# Patient Record
Sex: FEMALE | Race: WHITE | Employment: FULL TIME | URBAN - NONMETROPOLITAN AREA
[De-identification: names, ages, dates, MRNs, and addresses within clinical notes are randomized per-mention and may not be internally consistent; named-entity substitution may affect disease eponyms.]

---

## 2024-06-21 ENCOUNTER — NURSE ONLY (OUTPATIENT)
Dept: LAB | Age: 59
End: 2024-06-21

## 2024-06-21 DIAGNOSIS — Z51.81 MEDICATION MONITORING ENCOUNTER: ICD-10-CM

## 2024-06-21 DIAGNOSIS — M46.90 AXIAL SPONDYLOARTHRITIS (HCC): ICD-10-CM

## 2024-06-21 LAB
ALBUMIN SERPL BCG-MCNC: 4.3 G/DL (ref 3.5–5.1)
ALP SERPL-CCNC: 77 U/L (ref 38–126)
ALT SERPL W/O P-5'-P-CCNC: 16 U/L (ref 11–66)
ANION GAP SERPL CALC-SCNC: 11 MEQ/L (ref 8–16)
AST SERPL-CCNC: 17 U/L (ref 5–40)
BASOPHILS ABSOLUTE: 0 THOU/MM3 (ref 0–0.1)
BASOPHILS NFR BLD AUTO: 0.4 %
BILIRUB SERPL-MCNC: 0.4 MG/DL (ref 0.3–1.2)
BUN SERPL-MCNC: 21 MG/DL (ref 7–22)
CALCIUM SERPL-MCNC: 9.6 MG/DL (ref 8.5–10.5)
CHLORIDE SERPL-SCNC: 103 MEQ/L (ref 98–111)
CO2 SERPL-SCNC: 28 MEQ/L (ref 23–33)
CREAT SERPL-MCNC: 0.5 MG/DL (ref 0.4–1.2)
CRP SERPL-MCNC: < 0.3 MG/DL (ref 0–1)
DEPRECATED RDW RBC AUTO: 44.2 FL (ref 35–45)
EOSINOPHIL NFR BLD AUTO: 0.2 %
EOSINOPHILS ABSOLUTE: 0 THOU/MM3 (ref 0–0.4)
ERYTHROCYTE [DISTWIDTH] IN BLOOD BY AUTOMATED COUNT: 13 % (ref 11.5–14.5)
ERYTHROCYTE [SEDIMENTATION RATE] IN BLOOD BY WESTERGREN METHOD: 14 MM/HR (ref 0–20)
GFR SERPL CREATININE-BSD FRML MDRD: > 90 ML/MIN/1.73M2
GLUCOSE SERPL-MCNC: 105 MG/DL (ref 70–108)
HCT VFR BLD AUTO: 40.7 % (ref 37–47)
HGB BLD-MCNC: 13.7 GM/DL (ref 12–16)
IMM GRANULOCYTES # BLD AUTO: 0.05 THOU/MM3 (ref 0–0.07)
IMM GRANULOCYTES NFR BLD AUTO: 0.6 %
LYMPHOCYTES ABSOLUTE: 1.3 THOU/MM3 (ref 1–4.8)
LYMPHOCYTES NFR BLD AUTO: 16 %
MCH RBC QN AUTO: 31.6 PG (ref 26–33)
MCHC RBC AUTO-ENTMCNC: 33.7 GM/DL (ref 32.2–35.5)
MCV RBC AUTO: 93.8 FL (ref 81–99)
MONOCYTES ABSOLUTE: 0.5 THOU/MM3 (ref 0.4–1.3)
MONOCYTES NFR BLD AUTO: 5.7 %
NEUTROPHILS ABSOLUTE: 6.2 THOU/MM3 (ref 1.8–7.7)
NEUTROPHILS NFR BLD AUTO: 77.1 %
NRBC BLD AUTO-RTO: 0 /100 WBC
PLATELET # BLD AUTO: 229 THOU/MM3 (ref 130–400)
PMV BLD AUTO: 10.2 FL (ref 9.4–12.4)
POTASSIUM SERPL-SCNC: 3.5 MEQ/L (ref 3.5–5.2)
PROT SERPL-MCNC: 7.1 G/DL (ref 6.1–8)
RBC # BLD AUTO: 4.34 MILL/MM3 (ref 4.2–5.4)
SODIUM SERPL-SCNC: 142 MEQ/L (ref 135–145)
WBC # BLD AUTO: 8.1 THOU/MM3 (ref 4.8–10.8)

## 2024-06-21 RX ORDER — METHOTREXATE 25 MG/ML
20 INJECTION, SOLUTION INTRA-ARTERIAL; INTRAMUSCULAR; INTRAVENOUS WEEKLY
Qty: 4 ML | Refills: 0 | Status: SHIPPED | OUTPATIENT
Start: 2024-06-21

## 2024-07-17 ENCOUNTER — HOSPITAL ENCOUNTER (OUTPATIENT)
Dept: WOMENS IMAGING | Age: 59
Discharge: HOME | End: 2024-07-17
Payer: COMMERCIAL

## 2024-07-17 DIAGNOSIS — N63.21 MASS OF UPPER OUTER QUADRANT OF LEFT BREAST: ICD-10-CM

## 2024-07-17 PROCEDURE — G0279 TOMOSYNTHESIS, MAMMO: HCPCS

## 2024-07-17 PROCEDURE — 76642 ULTRASOUND BREAST LIMITED: CPT

## 2024-07-22 ENCOUNTER — LAB (OUTPATIENT)
Dept: LAB | Age: 59
End: 2024-07-22

## 2024-07-22 DIAGNOSIS — Z51.81 MEDICATION MONITORING ENCOUNTER: ICD-10-CM

## 2024-07-22 DIAGNOSIS — M46.90 AXIAL SPONDYLOARTHRITIS (HCC): ICD-10-CM

## 2024-07-22 LAB
ALBUMIN SERPL BCG-MCNC: 4.2 G/DL (ref 3.5–5.1)
ALP SERPL-CCNC: 80 U/L (ref 38–126)
ALT SERPL W/O P-5'-P-CCNC: 15 U/L (ref 11–66)
ANION GAP SERPL CALC-SCNC: 9 MEQ/L (ref 8–16)
AST SERPL-CCNC: 19 U/L (ref 5–40)
BASOPHILS ABSOLUTE: 0 THOU/MM3 (ref 0–0.1)
BASOPHILS NFR BLD AUTO: 0.4 %
BILIRUB SERPL-MCNC: 0.4 MG/DL (ref 0.3–1.2)
BUN SERPL-MCNC: 18 MG/DL (ref 7–22)
CALCIUM SERPL-MCNC: 9 MG/DL (ref 8.5–10.5)
CHLORIDE SERPL-SCNC: 104 MEQ/L (ref 98–111)
CO2 SERPL-SCNC: 27 MEQ/L (ref 23–33)
CREAT SERPL-MCNC: 0.5 MG/DL (ref 0.4–1.2)
CRP SERPL-MCNC: < 0.3 MG/DL (ref 0–1)
DEPRECATED RDW RBC AUTO: 45.1 FL (ref 35–45)
EOSINOPHIL NFR BLD AUTO: 1.6 %
EOSINOPHILS ABSOLUTE: 0.1 THOU/MM3 (ref 0–0.4)
ERYTHROCYTE [DISTWIDTH] IN BLOOD BY AUTOMATED COUNT: 13.1 % (ref 11.5–14.5)
ERYTHROCYTE [SEDIMENTATION RATE] IN BLOOD BY WESTERGREN METHOD: 10 MM/HR (ref 0–20)
GFR SERPL CREATININE-BSD FRML MDRD: > 90 ML/MIN/1.73M2
GLUCOSE SERPL-MCNC: 91 MG/DL (ref 70–108)
HCT VFR BLD AUTO: 40 % (ref 37–47)
HGB BLD-MCNC: 13.4 GM/DL (ref 12–16)
IMM GRANULOCYTES # BLD AUTO: 0.02 THOU/MM3 (ref 0–0.07)
IMM GRANULOCYTES NFR BLD AUTO: 0.4 %
LYMPHOCYTES ABSOLUTE: 1.1 THOU/MM3 (ref 1–4.8)
LYMPHOCYTES NFR BLD AUTO: 21.7 %
MCH RBC QN AUTO: 31.5 PG (ref 26–33)
MCHC RBC AUTO-ENTMCNC: 33.5 GM/DL (ref 32.2–35.5)
MCV RBC AUTO: 94.1 FL (ref 81–99)
MONOCYTES ABSOLUTE: 0.4 THOU/MM3 (ref 0.4–1.3)
MONOCYTES NFR BLD AUTO: 8 %
NEUTROPHILS ABSOLUTE: 3.5 THOU/MM3 (ref 1.8–7.7)
NEUTROPHILS NFR BLD AUTO: 67.9 %
NRBC BLD AUTO-RTO: 0 /100 WBC
PLATELET # BLD AUTO: 202 THOU/MM3 (ref 130–400)
PMV BLD AUTO: 10.1 FL (ref 9.4–12.4)
POTASSIUM SERPL-SCNC: 3.9 MEQ/L (ref 3.5–5.2)
PROT SERPL-MCNC: 6.5 G/DL (ref 6.1–8)
RBC # BLD AUTO: 4.25 MILL/MM3 (ref 4.2–5.4)
SODIUM SERPL-SCNC: 140 MEQ/L (ref 135–145)
WBC # BLD AUTO: 5.1 THOU/MM3 (ref 4.8–10.8)

## 2024-07-22 RX ORDER — METHOTREXATE 25 MG/ML
20 INJECTION, SOLUTION INTRA-ARTERIAL; INTRAMUSCULAR; INTRAVENOUS WEEKLY
Qty: 4 ML | Refills: 0 | Status: SHIPPED | OUTPATIENT
Start: 2024-07-22

## 2024-07-23 ENCOUNTER — OFFICE VISIT (OUTPATIENT)
Dept: RHEUMATOLOGY | Age: 59
End: 2024-07-23
Payer: COMMERCIAL

## 2024-07-23 VITALS
WEIGHT: 152.4 LBS | DIASTOLIC BLOOD PRESSURE: 70 MMHG | OXYGEN SATURATION: 92 % | SYSTOLIC BLOOD PRESSURE: 112 MMHG | HEIGHT: 62 IN | HEART RATE: 91 BPM | BODY MASS INDEX: 28.05 KG/M2

## 2024-07-23 DIAGNOSIS — M46.90 AXIAL SPONDYLOARTHRITIS (HCC): Primary | ICD-10-CM

## 2024-07-23 DIAGNOSIS — M99.04 SACRAL REGION SOMATIC DYSFUNCTION: ICD-10-CM

## 2024-07-23 DIAGNOSIS — M54.50 CHRONIC MIDLINE LOW BACK PAIN WITHOUT SCIATICA: ICD-10-CM

## 2024-07-23 DIAGNOSIS — G89.29 CHRONIC MIDLINE LOW BACK PAIN WITHOUT SCIATICA: ICD-10-CM

## 2024-07-23 DIAGNOSIS — Z51.81 MEDICATION MONITORING ENCOUNTER: ICD-10-CM

## 2024-07-23 PROCEDURE — 99214 OFFICE O/P EST MOD 30 MIN: CPT | Performed by: INTERNAL MEDICINE

## 2024-07-23 PROCEDURE — 3074F SYST BP LT 130 MM HG: CPT | Performed by: INTERNAL MEDICINE

## 2024-07-23 PROCEDURE — 98925 OSTEOPATH MANJ 1-2 REGIONS: CPT | Performed by: INTERNAL MEDICINE

## 2024-07-23 PROCEDURE — 3078F DIAST BP <80 MM HG: CPT | Performed by: INTERNAL MEDICINE

## 2024-07-23 RX ORDER — HYDROXYZINE HYDROCHLORIDE 10 MG/1
10 TABLET, FILM COATED ORAL 3 TIMES DAILY PRN
COMMUNITY

## 2024-07-23 RX ORDER — SYRINGE-NEEDLE,INSULIN,0.5 ML 27GX1/2"
1 SYRINGE, EMPTY DISPOSABLE MISCELLANEOUS DAILY
Qty: 100 EACH | Refills: 0 | Status: SHIPPED | OUTPATIENT
Start: 2024-07-23

## 2024-07-23 RX ORDER — SERTRALINE HYDROCHLORIDE 100 MG/1
TABLET, FILM COATED ORAL
COMMUNITY
Start: 2024-07-11

## 2024-07-23 ASSESSMENT — ENCOUNTER SYMPTOMS
EYES NEGATIVE: 1
RESPIRATORY NEGATIVE: 1
GASTROINTESTINAL NEGATIVE: 1

## 2024-07-23 NOTE — PROGRESS NOTES
[facility] RHEUMATOLOGY FOLLOW UP NOTE       Date Of Service: 7/23/2024  Name: Felicity Landeros   MRN: [id number]      Chief Complaint   Patient presents with    Follow-up     4-month follow-up for axial spondyloarthritis.          Felicity Landeros  is a(n)59 y.o. female with a hx of depression, HTN, UTI, anxiety, vitamin D def, HTN here for the f/u evaluation of seronegative inflammatory arthritis, carpal tunnel bilat, trigger finger, osteoarthritis bilateral hands, med monitoring.     Interval hx:      -  may 7th, 2024.      - trigger finger release by Dr. Matamoros.  Injection for dupetron contracture. June 13, 2024.     Left lower back pain - started for the past several months - pain up to 9/10 - localied left outer hip pain worse with laying in bed, prolonged sitting.   -- occsaional tinglin in the left leg.  - no weakness   AM stiffness lasting ~ 5 minutes      REVIEW OF SYSTEMS: (ROS)    Review of Systems   Constitutional: Negative.    HENT: Negative.     Eyes: Negative.    Respiratory: Negative.     Cardiovascular: Negative.    Gastrointestinal: Negative.    Endocrine: Negative.    Genitourinary: Negative.    Skin: Negative.    Neurological: Negative.    Hematological: Negative.        PmHx:  has a past medical history of Depression, Dupuytren's disease, Hypertension, and UTI (urinary tract infection).     Social History:  reports that she has never smoked. She has never used smokeless tobacco. She reports that she does not drink alcohol and does not use drugs.    Allergies   Allergen Reactions    Penicillins Hives       Current Outpatient Medications   Medication Instructions    Cholecalciferol (VITAMIN D3) 2000 units CAPS Oral, DAILY    folic acid (FOLVITE) 1 mg, Oral, DAILY    hydrOXYzine HCl (ATARAX) 10 mg, Oral, 3 TIMES DAILY PRN    Insulin Syringe-Needle U-100 30G X 5/16\" 1 ML MISC 1 each, Does not apply, WEEKLY    lisinopril (PRINIVIL;ZESTRIL) 20 mg, Oral, DAILY    methotrexate Sodium 20 mg,

## 2024-08-10 DIAGNOSIS — M46.90 AXIAL SPONDYLOARTHRITIS (HCC): ICD-10-CM

## 2024-08-13 RX ORDER — FOLIC ACID 1 MG/1
1 TABLET ORAL DAILY
Qty: 90 TABLET | Refills: 1 | Status: SHIPPED | OUTPATIENT
Start: 2024-08-13

## 2024-08-13 RX ORDER — SYRINGE-NEEDLE,INSULIN,0.5 ML 27GX1/2"
1 SYRINGE, EMPTY DISPOSABLE MISCELLANEOUS DAILY
Qty: 100 EACH | Refills: 0 | OUTPATIENT
Start: 2024-08-13

## 2024-08-29 DIAGNOSIS — M46.90 AXIAL SPONDYLOARTHRITIS (HCC): ICD-10-CM

## 2024-08-29 RX ORDER — SECUKINUMAB 150 MG/ML
INJECTION SUBCUTANEOUS
Qty: 2 ADJUSTABLE DOSE PRE-FILLED PEN SYRINGE | Refills: 4 | Status: ACTIVE | OUTPATIENT
Start: 2024-08-29

## 2024-09-08 DIAGNOSIS — M46.90 AXIAL SPONDYLOARTHRITIS (HCC): ICD-10-CM

## 2024-09-09 RX ORDER — METHOTREXATE 25 MG/ML
20 INJECTION, SOLUTION INTRA-ARTERIAL; INTRAMUSCULAR; INTRAVENOUS WEEKLY
Qty: 4 ML | Refills: 0 | Status: SHIPPED | OUTPATIENT
Start: 2024-09-09

## 2024-09-16 ENCOUNTER — TELEPHONE (OUTPATIENT)
Dept: RHEUMATOLOGY | Age: 59
End: 2024-09-16

## 2024-09-16 ENCOUNTER — LAB (OUTPATIENT)
Dept: LAB | Age: 59
End: 2024-09-16

## 2024-09-16 DIAGNOSIS — Z51.81 MEDICATION MONITORING ENCOUNTER: ICD-10-CM

## 2024-09-16 DIAGNOSIS — Z51.81 MEDICATION MONITORING ENCOUNTER: Primary | ICD-10-CM

## 2024-09-16 LAB
ALBUMIN SERPL BCG-MCNC: 4.2 G/DL (ref 3.5–5.1)
ALP SERPL-CCNC: 78 U/L (ref 38–126)
ALT SERPL W/O P-5'-P-CCNC: 15 U/L (ref 11–66)
ANION GAP SERPL CALC-SCNC: 12 MEQ/L (ref 8–16)
AST SERPL-CCNC: 22 U/L (ref 5–40)
BASOPHILS ABSOLUTE: 0 THOU/MM3 (ref 0–0.1)
BASOPHILS NFR BLD AUTO: 0.6 %
BILIRUB SERPL-MCNC: 0.4 MG/DL (ref 0.3–1.2)
BUN SERPL-MCNC: 16 MG/DL (ref 7–22)
CALCIUM SERPL-MCNC: 9.3 MG/DL (ref 8.5–10.5)
CHLORIDE SERPL-SCNC: 104 MEQ/L (ref 98–111)
CO2 SERPL-SCNC: 25 MEQ/L (ref 23–33)
CREAT SERPL-MCNC: 0.5 MG/DL (ref 0.4–1.2)
CRP SERPL-MCNC: < 0.3 MG/DL (ref 0–1)
DEPRECATED RDW RBC AUTO: 43.4 FL (ref 35–45)
EOSINOPHIL NFR BLD AUTO: 1.9 %
EOSINOPHILS ABSOLUTE: 0.1 THOU/MM3 (ref 0–0.4)
ERYTHROCYTE [DISTWIDTH] IN BLOOD BY AUTOMATED COUNT: 12.7 % (ref 11.5–14.5)
ERYTHROCYTE [SEDIMENTATION RATE] IN BLOOD BY WESTERGREN METHOD: 11 MM/HR (ref 0–20)
GFR SERPL CREATININE-BSD FRML MDRD: > 90 ML/MIN/1.73M2
GLUCOSE SERPL-MCNC: 115 MG/DL (ref 70–108)
HCT VFR BLD AUTO: 36.3 % (ref 37–47)
HGB BLD-MCNC: 12.2 GM/DL (ref 12–16)
IMM GRANULOCYTES # BLD AUTO: 0.01 THOU/MM3 (ref 0–0.07)
IMM GRANULOCYTES NFR BLD AUTO: 0.2 %
LYMPHOCYTES ABSOLUTE: 1.2 THOU/MM3 (ref 1–4.8)
LYMPHOCYTES NFR BLD AUTO: 24.8 %
MCH RBC QN AUTO: 31.4 PG (ref 26–33)
MCHC RBC AUTO-ENTMCNC: 33.6 GM/DL (ref 32.2–35.5)
MCV RBC AUTO: 93.3 FL (ref 81–99)
MONOCYTES ABSOLUTE: 0.4 THOU/MM3 (ref 0.4–1.3)
MONOCYTES NFR BLD AUTO: 7.9 %
NEUTROPHILS ABSOLUTE: 3.1 THOU/MM3 (ref 1.8–7.7)
NEUTROPHILS NFR BLD AUTO: 64.6 %
NRBC BLD AUTO-RTO: 0 /100 WBC
PLATELET # BLD AUTO: 224 THOU/MM3 (ref 130–400)
PMV BLD AUTO: 10.4 FL (ref 9.4–12.4)
POTASSIUM SERPL-SCNC: 3.9 MEQ/L (ref 3.5–5.2)
PROT SERPL-MCNC: 6.6 G/DL (ref 6.1–8)
RBC # BLD AUTO: 3.89 MILL/MM3 (ref 4.2–5.4)
SODIUM SERPL-SCNC: 141 MEQ/L (ref 135–145)
WBC # BLD AUTO: 4.8 THOU/MM3 (ref 4.8–10.8)

## 2024-10-14 DIAGNOSIS — M46.90 AXIAL SPONDYLOARTHRITIS (HCC): ICD-10-CM

## 2024-10-14 RX ORDER — METHOTREXATE 25 MG/ML
20 INJECTION, SOLUTION INTRA-ARTERIAL; INTRAMUSCULAR; INTRAVENOUS WEEKLY
Qty: 4 ML | Refills: 0 | Status: SHIPPED | OUTPATIENT
Start: 2024-10-14

## 2024-10-14 RX ORDER — SYRINGE-NEEDLE,INSULIN,0.5 ML 27GX1/2"
1 SYRINGE, EMPTY DISPOSABLE MISCELLANEOUS DAILY
Qty: 100 EACH | Refills: 0 | Status: SHIPPED | OUTPATIENT
Start: 2024-10-14

## 2024-10-14 NOTE — TELEPHONE ENCOUNTER
DOLV: 7/23/24  DONV: 1/23/25  LAST LAB DRAW: 9/16/24  LAST TB TEST: 4/10/24    Lab Results   Component Value Date     09/16/2024    K 3.9 09/16/2024     09/16/2024    CO2 25 09/16/2024    BUN 16 09/16/2024    CREATININE 0.5 09/16/2024    GLUCOSE 115 (H) 09/16/2024    CALCIUM 9.3 09/16/2024    BILITOT 0.4 09/16/2024    ALKPHOS 78 09/16/2024    AST 22 09/16/2024    ALT 15 09/16/2024    LABGLOM > 90 09/16/2024       Recent Labs     09/16/24  1516   WBC 4.8   HGB 12.2   HCT 36.3*   MCV 93.3          Lab Results   Component Value Date    SEDRATE 11 09/16/2024       Lab Results   Component Value Date    CRP < 0.30 09/16/2024

## 2024-10-27 ENCOUNTER — PATIENT MESSAGE (OUTPATIENT)
Dept: RHEUMATOLOGY | Age: 59
End: 2024-10-27

## 2024-10-29 ENCOUNTER — OFFICE VISIT (OUTPATIENT)
Dept: RHEUMATOLOGY | Age: 59
End: 2024-10-29
Payer: COMMERCIAL

## 2024-10-29 VITALS
HEIGHT: 62 IN | OXYGEN SATURATION: 98 % | HEART RATE: 90 BPM | WEIGHT: 153 LBS | DIASTOLIC BLOOD PRESSURE: 82 MMHG | SYSTOLIC BLOOD PRESSURE: 126 MMHG | BODY MASS INDEX: 28.16 KG/M2

## 2024-10-29 DIAGNOSIS — G89.29 CHRONIC LEFT SHOULDER PAIN: ICD-10-CM

## 2024-10-29 DIAGNOSIS — M25.552 CHRONIC LEFT HIP PAIN: ICD-10-CM

## 2024-10-29 DIAGNOSIS — Z51.81 MEDICATION MONITORING ENCOUNTER: ICD-10-CM

## 2024-10-29 DIAGNOSIS — M25.512 CHRONIC LEFT SHOULDER PAIN: ICD-10-CM

## 2024-10-29 DIAGNOSIS — M46.90 AXIAL SPONDYLOARTHRITIS (HCC): Primary | ICD-10-CM

## 2024-10-29 DIAGNOSIS — G89.29 CHRONIC LEFT HIP PAIN: ICD-10-CM

## 2024-10-29 DIAGNOSIS — M99.04 SACRAL REGION SOMATIC DYSFUNCTION: ICD-10-CM

## 2024-10-29 DIAGNOSIS — G89.29 CHRONIC MIDLINE LOW BACK PAIN WITHOUT SCIATICA: ICD-10-CM

## 2024-10-29 DIAGNOSIS — M54.50 CHRONIC MIDLINE LOW BACK PAIN WITHOUT SCIATICA: ICD-10-CM

## 2024-10-29 PROCEDURE — 3079F DIAST BP 80-89 MM HG: CPT | Performed by: INTERNAL MEDICINE

## 2024-10-29 PROCEDURE — 3074F SYST BP LT 130 MM HG: CPT | Performed by: INTERNAL MEDICINE

## 2024-10-29 PROCEDURE — 99214 OFFICE O/P EST MOD 30 MIN: CPT | Performed by: INTERNAL MEDICINE

## 2024-10-29 RX ORDER — METHOTREXATE 25 MG/ML
15 INJECTION, SOLUTION INTRA-ARTERIAL; INTRAMUSCULAR; INTRAVENOUS WEEKLY
Qty: 4 ML | Refills: 0
Start: 2024-10-29

## 2024-10-29 ASSESSMENT — ENCOUNTER SYMPTOMS
RESPIRATORY NEGATIVE: 1
EYES NEGATIVE: 1
GASTROINTESTINAL NEGATIVE: 1

## 2024-10-29 NOTE — PROGRESS NOTES
kg (153 lb)   SpO2 98%   BMI 27.98 kg/m²     Physical Exam  Vitals reviewed.   Constitutional:       Appearance: She is well-developed.   Cardiovascular:      Rate and Rhythm: Normal rate and regular rhythm.   Pulmonary:      Effort: Pulmonary effort is normal.      Breath sounds: Normal breath sounds.   Musculoskeletal:      Cervical back: Normal range of motion and neck supple.   Skin:     General: Skin is warm and dry.      Findings: No rash.   Neurological:      Mental Status: She is alert and oriented to person, place, and time.   Psychiatric:         Thought Content: Thought content normal.       Upper extremities:    Hands - dupeton contractures.     Lower extremities:  HIPS tender left outer hips - negative david / fadir   Knees/ankles - Non-tender @ joint space.   FEET : tender left mid foot w/ compression.     Spine: tender lower lumbar spine, lumbosacral junction and perisacral region.    - negative SLR.       RAPID 3:   10/29/2024 --- RAPID 3: 2.7 +9+7 = 18.7     Remission: <3  Low Disease Activity: <6  Moderate Disease Activity: >=6 and <=12  High Disease Activity: >12     LABS    CBC  Lab Results   Component Value Date/Time    WBC 4.8 09/16/2024 03:16 PM    RBC 3.89 09/16/2024 03:16 PM    RBC 4.13 07/06/2020 02:43 PM    HGB 12.2 09/16/2024 03:16 PM    HCT 36.3 09/16/2024 03:16 PM    MCV 93.3 09/16/2024 03:16 PM    MCH 31.4 09/16/2024 03:16 PM    MCHC 33.6 09/16/2024 03:16 PM    RDW 13.4 07/06/2020 02:43 PM     09/16/2024 03:16 PM    MPV 10.4 09/16/2024 03:16 PM       CMP  Lab Results   Component Value Date/Time    CALCIUM 9.3 09/16/2024 03:16 PM     09/16/2024 03:16 PM    K 3.9 09/16/2024 03:16 PM    CO2 25 09/16/2024 03:16 PM     09/16/2024 03:16 PM    BUN 16 09/16/2024 03:16 PM    CREATININE 0.5 09/16/2024 03:16 PM    ALKPHOS 78 09/16/2024 03:16 PM    ALT 15 09/16/2024 03:16 PM    AST 22 09/16/2024 03:16 PM    BILIDIR <0.2 03/24/2022 03:00 PM       Lab Results   Component Value

## 2024-10-31 ENCOUNTER — HOSPITAL ENCOUNTER (OUTPATIENT)
Age: 59
Discharge: HOME | End: 2024-10-31
Payer: COMMERCIAL

## 2024-10-31 ENCOUNTER — HOSPITAL ENCOUNTER (OUTPATIENT)
Dept: GENERAL RADIOLOGY | Age: 59
Discharge: HOME | End: 2024-10-31
Payer: COMMERCIAL

## 2024-10-31 DIAGNOSIS — M46.90 AXIAL SPONDYLOARTHRITIS (HCC): ICD-10-CM

## 2024-10-31 DIAGNOSIS — M54.50 CHRONIC MIDLINE LOW BACK PAIN WITHOUT SCIATICA: ICD-10-CM

## 2024-10-31 DIAGNOSIS — M25.552 CHRONIC LEFT HIP PAIN: ICD-10-CM

## 2024-10-31 DIAGNOSIS — G89.29 CHRONIC MIDLINE LOW BACK PAIN WITHOUT SCIATICA: ICD-10-CM

## 2024-10-31 DIAGNOSIS — G89.29 CHRONIC LEFT HIP PAIN: ICD-10-CM

## 2024-10-31 PROCEDURE — 72100 X-RAY EXAM L-S SPINE 2/3 VWS: CPT

## 2024-11-04 ENCOUNTER — PATIENT MESSAGE (OUTPATIENT)
Dept: RHEUMATOLOGY | Age: 59
End: 2024-11-04

## 2024-11-11 ENCOUNTER — HOSPITAL ENCOUNTER (OUTPATIENT)
Dept: PHYSICAL THERAPY | Age: 59
Setting detail: THERAPIES SERIES
Discharge: HOME | End: 2024-11-11
Payer: COMMERCIAL

## 2024-11-11 PROCEDURE — 97162 PT EVAL MOD COMPLEX 30 MIN: CPT

## 2024-11-11 PROCEDURE — 97035 APP MDLTY 1+ULTRASOUND EA 15: CPT

## 2024-11-11 NOTE — PROGRESS NOTES
* PLEASE SIGN, DATE AND TIME CERTIFICATION BELOW AND RETURN TO [facility] OUTPATIENT REHABILITATION (FAX #: [phone]).  ATTEST/CO-SIGN IF ACCESSING VIA INAnagranET.  THANK YOU.**    I certify that I have examined the patient below and determined that Physical Medicine and Rehabilitation service is necessary and that I approve the established plan of care for up to 90 days or as specifically noted.  Attestation, signature or co-signature of physician indicates approval of certification requirements.    ________________________ ____________  Physician Signature   Date   [facility]  PHYSICAL THERAPY  [x] EVALUATION  [] DAILY NOTE (LAND) [] DAILY NOTE (AQUATIC ) [] PROGRESS NOTE [] DISCHARGE NOTE    [] OUTPATIENT REHABILITATION CENTER [facility]   [] [facility]    [] [facility]   [] [facility]    Date: 2024  Patient Name:  Felicity Landeros  : 1965  MRN: [id number]  CSN: [id number]    Referring Practitioner Dary Valiente DO [id number]      Diagnosis  Diagnoses       M54.50 (ICD-10-CM) - Low back pain, unspecified    M25.552 (ICD-10-CM) - Pain in left hip           Treatment Diagnosis M54.42  Lumbago with Sciatica, Left Side  M54.59  Other Low Back Pain   Date of Evaluation 24   Additional Pertinent History Felicity Landeros has a past medical history of Depression, Dupuytren's disease, Hypertension, and UTI (urinary tract infection).  she has a past surgical history that includes knee surgery (Bilateral, );  section; Finger trigger release (Left, 2018); Carpal tunnel release; Finger trigger release (Right, 2023); and Finger trigger release (Right, 2024).  Kaleigh MARINELLI ,    Allergies Allergies   Allergen Reactions    Penicillins Hives      Medications   Current Outpatient Medications:     methotrexate Sodium 50 MG/2ML chemo injection, Inject 0.6 mLs into the skin once a week, Disp: 4 mL, Rfl: 0    Insulin

## 2024-11-14 ENCOUNTER — HOSPITAL ENCOUNTER (OUTPATIENT)
Dept: PHYSICAL THERAPY | Age: 59
Setting detail: THERAPIES SERIES
Discharge: HOME | End: 2024-11-14
Payer: COMMERCIAL

## 2024-11-14 ENCOUNTER — LAB (OUTPATIENT)
Dept: LAB | Age: 59
End: 2024-11-14

## 2024-11-14 DIAGNOSIS — Z51.81 MEDICATION MONITORING ENCOUNTER: ICD-10-CM

## 2024-11-14 LAB
ALBUMIN SERPL BCG-MCNC: 4.4 G/DL (ref 3.5–5.1)
ALP SERPL-CCNC: 91 U/L (ref 38–126)
ALT SERPL W/O P-5'-P-CCNC: 20 U/L (ref 11–66)
ANION GAP SERPL CALC-SCNC: 11 MEQ/L (ref 8–16)
AST SERPL-CCNC: 21 U/L (ref 5–40)
BASOPHILS ABSOLUTE: 0 THOU/MM3 (ref 0–0.1)
BASOPHILS NFR BLD AUTO: 0.8 %
BILIRUB SERPL-MCNC: 0.4 MG/DL (ref 0.3–1.2)
BUN SERPL-MCNC: 17 MG/DL (ref 7–22)
CALCIUM SERPL-MCNC: 9.8 MG/DL (ref 8.5–10.5)
CHLORIDE SERPL-SCNC: 104 MEQ/L (ref 98–111)
CO2 SERPL-SCNC: 28 MEQ/L (ref 23–33)
CREAT SERPL-MCNC: 0.6 MG/DL (ref 0.4–1.2)
CRP SERPL-MCNC: < 0.3 MG/DL (ref 0–1)
DEPRECATED RDW RBC AUTO: 40.2 FL (ref 35–45)
EOSINOPHIL NFR BLD AUTO: 2.7 %
EOSINOPHILS ABSOLUTE: 0.1 THOU/MM3 (ref 0–0.4)
ERYTHROCYTE [DISTWIDTH] IN BLOOD BY AUTOMATED COUNT: 12.4 % (ref 11.5–14.5)
ERYTHROCYTE [SEDIMENTATION RATE] IN BLOOD BY WESTERGREN METHOD: 10 MM/HR (ref 0–20)
GFR SERPL CREATININE-BSD FRML MDRD: > 90 ML/MIN/1.73M2
GLUCOSE SERPL-MCNC: 104 MG/DL (ref 70–108)
HCT VFR BLD AUTO: 38.9 % (ref 37–47)
HGB BLD-MCNC: 13.2 GM/DL (ref 12–16)
IMM GRANULOCYTES # BLD AUTO: 0.01 THOU/MM3 (ref 0–0.07)
IMM GRANULOCYTES NFR BLD AUTO: 0.2 %
LYMPHOCYTES ABSOLUTE: 1.4 THOU/MM3 (ref 1–4.8)
LYMPHOCYTES NFR BLD AUTO: 26.5 %
MCH RBC QN AUTO: 30.8 PG (ref 26–33)
MCHC RBC AUTO-ENTMCNC: 33.9 GM/DL (ref 32.2–35.5)
MCV RBC AUTO: 90.9 FL (ref 81–99)
MONOCYTES ABSOLUTE: 0.5 THOU/MM3 (ref 0.4–1.3)
MONOCYTES NFR BLD AUTO: 8.8 %
NEUTROPHILS ABSOLUTE: 3.2 THOU/MM3 (ref 1.8–7.7)
NEUTROPHILS NFR BLD AUTO: 61 %
NRBC BLD AUTO-RTO: 0 /100 WBC
PLATELET # BLD AUTO: 234 THOU/MM3 (ref 130–400)
PMV BLD AUTO: 10.3 FL (ref 9.4–12.4)
POTASSIUM SERPL-SCNC: 4 MEQ/L (ref 3.5–5.2)
PROT SERPL-MCNC: 7.2 G/DL (ref 6.1–8)
RBC # BLD AUTO: 4.28 MILL/MM3 (ref 4.2–5.4)
SODIUM SERPL-SCNC: 143 MEQ/L (ref 135–145)
WBC # BLD AUTO: 5.2 THOU/MM3 (ref 4.8–10.8)

## 2024-11-14 PROCEDURE — 97035 APP MDLTY 1+ULTRASOUND EA 15: CPT

## 2024-11-14 PROCEDURE — 97110 THERAPEUTIC EXERCISES: CPT

## 2024-11-14 PROCEDURE — 97530 THERAPEUTIC ACTIVITIES: CPT

## 2024-11-14 NOTE — PROGRESS NOTES
[facility]  PHYSICAL THERAPY  [] EVALUATION  [x] DAILY NOTE (LAND) [] DAILY NOTE (AQUATIC ) [] PROGRESS NOTE [] DISCHARGE NOTE    [] OUTPATIENT REHABILITATION CENTER [facility]   [] [place] [facility]    [] [facility]   [x] [facility]    Date: 2024  Patient Name:  Felicity Landeros  : 1965  MRN: [id number]  CSN: [id number]    Referring Practitioner Dary Valiente DO [id number]      Diagnosis  Diagnoses       M54.50 (ICD-10-CM) - Low back pain, unspecified    M25.552 (ICD-10-CM) - Pain in left hip           Treatment Diagnosis M54.42  Lumbago with Sciatica, Left Side  M54.59  Other Low Back Pain   Date of Evaluation 24   Additional Pertinent History Felicity Landeros has a past medical history of Depression, Dupuytren's disease, Hypertension, and UTI (urinary tract infection).  she has a past surgical history that includes knee surgery (Bilateral, );  section; Finger trigger release (Left, 2018); Carpal tunnel release; Finger trigger release (Right, 2023); and Finger trigger release (Right, 2024).  Kaleigh MARINELLI ,    Allergies Allergies   Allergen Reactions    Penicillins Hives      Medications   Current Outpatient Medications:     methotrexate Sodium 50 MG/2ML chemo injection, Inject 0.6 mLs into the skin once a week, Disp: 4 mL, Rfl: 0    Insulin Syringe-Needle U-100 31G X \" 1 ML MISC, 1 each by Does not apply route daily, Disp: 100 each, Rfl: 0    secukinumab, 300 MG Dose, (COSENTYX SENSOREADY, 300 MG,) 150 MG/ML SOAJ, INJECT 2 PENS UNDER THE SKIN EVERY 4 WEEKS, Disp: 2 Adjustable Dose Pre-filled Pen Syringe, Rfl: 4    folic acid (FOLVITE) 1 MG tablet, Take 1 tablet by mouth daily, Disp: 90 tablet, Rfl: 1    sertraline (ZOLOFT) 100 MG tablet, , Disp: , Rfl:     hydrOXYzine HCl (ATARAX) 10 MG tablet, Take 1 tablet by mouth 3 times daily as needed for Itching, Disp: , Rfl:     Multiple Vitamins-Minerals

## 2024-11-15 DIAGNOSIS — M46.90 AXIAL SPONDYLOARTHRITIS (HCC): ICD-10-CM

## 2024-11-15 RX ORDER — METHOTREXATE 25 MG/ML
15 INJECTION, SOLUTION INTRA-ARTERIAL; INTRAMUSCULAR; INTRAVENOUS WEEKLY
Qty: 4 ML | Refills: 1 | Status: SHIPPED | OUTPATIENT
Start: 2024-11-15

## 2024-11-20 ENCOUNTER — HOSPITAL ENCOUNTER (OUTPATIENT)
Dept: PHYSICAL THERAPY | Age: 59
Setting detail: THERAPIES SERIES
Discharge: HOME | End: 2024-11-20
Payer: COMMERCIAL

## 2024-11-20 ENCOUNTER — TELEPHONE (OUTPATIENT)
Dept: RHEUMATOLOGY | Age: 59
End: 2024-11-20

## 2024-11-20 PROCEDURE — 97530 THERAPEUTIC ACTIVITIES: CPT

## 2024-11-20 NOTE — PROGRESS NOTES
[facility]  PHYSICAL THERAPY  [] EVALUATION  [x] DAILY NOTE (LAND) [] DAILY NOTE (AQUATIC ) [] PROGRESS NOTE [] DISCHARGE NOTE    [] OUTPATIENT REHABILITATION CENTER [facility]   [] [place] [facility]    [] [facility]   [x] [facility]    Date: 2024  Patient Name:  Felicity Landeros  : 1965  MRN: [id number]  CSN: [id number]    Referring Practitioner Dary Valiente DO [id number]      Diagnosis  Diagnoses       M54.50 (ICD-10-CM) - Low back pain, unspecified    M25.552 (ICD-10-CM) - Pain in left hip           Treatment Diagnosis M54.42  Lumbago with Sciatica, Left Side  M54.59  Other Low Back Pain   Date of Evaluation 24   Additional Pertinent History Felicity Landeros has a past medical history of Depression, Dupuytren's disease, Hypertension, and UTI (urinary tract infection).  she has a past surgical history that includes knee surgery (Bilateral, );  section; Finger trigger release (Left, 2018); Carpal tunnel release; Finger trigger release (Right, 2023); and Finger trigger release (Right, 2024).  Kaleigh MARINELLI ,    Allergies Allergies   Allergen Reactions    Penicillins Hives      Medications   Current Outpatient Medications:     methotrexate Sodium 50 MG/2ML chemo injection, Inject 0.6 mLs into the skin once a week, Disp: 4 mL, Rfl: 1    Insulin Syringe-Needle U-100 31G X \" 1 ML MISC, 1 each by Does not apply route daily, Disp: 100 each, Rfl: 0    secukinumab, 300 MG Dose, (COSENTYX SENSOREADY, 300 MG,) 150 MG/ML SOAJ, INJECT 2 PENS UNDER THE SKIN EVERY 4 WEEKS, Disp: 2 Adjustable Dose Pre-filled Pen Syringe, Rfl: 4    folic acid (FOLVITE) 1 MG tablet, Take 1 tablet by mouth daily, Disp: 90 tablet, Rfl: 1    sertraline (ZOLOFT) 100 MG tablet, , Disp: , Rfl:     hydrOXYzine HCl (ATARAX) 10 MG tablet, Take 1 tablet by mouth 3 times daily as needed for Itching, Disp: , Rfl:     Multiple Vitamins-Minerals

## 2024-11-22 ENCOUNTER — HOSPITAL ENCOUNTER (OUTPATIENT)
Dept: PHYSICAL THERAPY | Age: 59
Setting detail: THERAPIES SERIES
Discharge: HOME | End: 2024-11-22
Payer: COMMERCIAL

## 2024-11-22 ENCOUNTER — PATIENT MESSAGE (OUTPATIENT)
Dept: RHEUMATOLOGY | Age: 59
End: 2024-11-22

## 2024-11-22 PROCEDURE — 97530 THERAPEUTIC ACTIVITIES: CPT

## 2024-11-22 NOTE — PROGRESS NOTES
[facility]  PHYSICAL THERAPY  [] EVALUATION  [x] DAILY NOTE (LAND) [] DAILY NOTE (AQUATIC ) [] PROGRESS NOTE [] DISCHARGE NOTE    [] OUTPATIENT REHABILITATION CENTER [facility]   [] [place] [facility]    [] [facility]   [x] [facility]    Date: 2024  Patient Name:  Felicity Landeros  : 1965  MRN: [id number]  CSN: [id number]    Referring Practitioner Dary Valiente DO [id number]      Diagnosis  Diagnoses       M54.50 (ICD-10-CM) - Low back pain, unspecified    M25.552 (ICD-10-CM) - Pain in left hip           Treatment Diagnosis M54.42  Lumbago with Sciatica, Left Side  M54.59  Other Low Back Pain   Date of Evaluation 24   Additional Pertinent History Felicity Landeros has a past medical history of Depression, Dupuytren's disease, Hypertension, and UTI (urinary tract infection).  she has a past surgical history that includes knee surgery (Bilateral, );  section; Finger trigger release (Left, 2018); Carpal tunnel release; Finger trigger release (Right, 2023); and Finger trigger release (Right, 2024).  Kaleigh MARINELLI ,    Allergies Allergies   Allergen Reactions    Penicillins Hives      Medications   Current Outpatient Medications:     methotrexate Sodium 50 MG/2ML chemo injection, Inject 0.6 mLs into the skin once a week, Disp: 4 mL, Rfl: 1    Insulin Syringe-Needle U-100 31G X \" 1 ML MISC, 1 each by Does not apply route daily, Disp: 100 each, Rfl: 0    secukinumab, 300 MG Dose, (COSENTYX SENSOREADY, 300 MG,) 150 MG/ML SOAJ, INJECT 2 PENS UNDER THE SKIN EVERY 4 WEEKS, Disp: 2 Adjustable Dose Pre-filled Pen Syringe, Rfl: 4    folic acid (FOLVITE) 1 MG tablet, Take 1 tablet by mouth daily, Disp: 90 tablet, Rfl: 1    sertraline (ZOLOFT) 100 MG tablet, , Disp: , Rfl:     hydrOXYzine HCl (ATARAX) 10 MG tablet, Take 1 tablet by mouth 3 times daily as needed for Itching, Disp: , Rfl:     Multiple Vitamins-Minerals

## 2024-11-26 ENCOUNTER — HOSPITAL ENCOUNTER (OUTPATIENT)
Dept: PHYSICAL THERAPY | Age: 59
Setting detail: THERAPIES SERIES
Discharge: HOME | End: 2024-11-26
Payer: COMMERCIAL

## 2024-11-26 PROCEDURE — 97530 THERAPEUTIC ACTIVITIES: CPT

## 2024-11-26 NOTE — PROGRESS NOTES
the left si joint, L5S1 in right sidelying  10 min   1.2  cont  x    MHP  Concurrent with Supine Exercise x          Manual Therapy Time/Technique  Notes                     Exercise/Intervention   Notes   Nustep X5 min  x Load 5, arms 8, seat 4                 Supine:          Quad set x10 5 sec X       Glute sets x10 5 sec X       Abd set x10 5 sec X       Pelvic tilt x10  X To neutral           Prone:          Press up x10  X Reports decreased pain      Prop X2 min  X       Prone flat X2 min  X       Prone Hamstring Curls x10  X       Prone Hip Abduction x10  X       Prone Hip Extension  x10  X           Quadruped:    held Patient Reports this position is intolerable due to knee pain                                                               Specific Interventions Next Treatment: US 2-3 times, lumbar stabs, Extension if continue to relieve pain     Activity/Treatment Tolerance:  []  Patient tolerated treatment well  []  Patient limited by fatigue  [x]  Patient limited by pain   []  Patient limited by medical complications  []  Other:     Assessment: Treatment interventions completed as recorded above. Patient tolerated treatment well.     Body Structures/Functions/Activity Limitations: impaired activity tolerance, impaired muscle tone, impaired ROM, impaired strength, pain, and abnormal posture  Prognosis: good    GOALS:  Patient Goal: less pain , sleep better at night, cont working     Short term goal to be met in 5 weeks     Pt will report  decrease in low back pain to 4/10   Increase lumbar extension unloaded to 50% of normal    Increase core strength to 4/5  Increase B hip strength to 4+/5  Intititate HEP     Long term goals to be met in 10 weeks weeks    Pt will report pain less than 2/10 and intermittent so she can sleep better at night   Increase B hip ROM to ER to 40, and to allow for neutral spine with squatting to protect the lumbar spine  Increase B hip strength to 5/5 to allow for  rising with

## 2024-12-01 ENCOUNTER — PATIENT MESSAGE (OUTPATIENT)
Dept: RHEUMATOLOGY | Age: 59
End: 2024-12-01

## 2024-12-03 ENCOUNTER — HOSPITAL ENCOUNTER (OUTPATIENT)
Dept: PHYSICAL THERAPY | Age: 59
Setting detail: THERAPIES SERIES
Discharge: HOME | End: 2024-12-03
Payer: COMMERCIAL

## 2024-12-03 PROCEDURE — 97032 APPL MODALITY 1+ESTIM EA 15: CPT

## 2024-12-03 PROCEDURE — 97530 THERAPEUTIC ACTIVITIES: CPT

## 2024-12-03 NOTE — PROGRESS NOTES
[facility]  PHYSICAL THERAPY  [] EVALUATION  [x] DAILY NOTE (LAND) [] DAILY NOTE (AQUATIC ) [] PROGRESS NOTE [] DISCHARGE NOTE    [] OUTPATIENT REHABILITATION CENTER [facility]   [] [place] [facility]    [] [facility]   [x] [facility]    Date: 12/3/2024  Patient Name:  Felicity Landeros  : 1965  MRN: [id number]  CSN: [id number]    Referring Practitioner Dary Valiente DO [id number]      Diagnosis  Diagnoses       M54.50 (ICD-10-CM) - Low back pain, unspecified    M25.552 (ICD-10-CM) - Pain in left hip           Treatment Diagnosis M54.42  Lumbago with Sciatica, Left Side  M54.59  Other Low Back Pain   Date of Evaluation 24   Additional Pertinent History Felicity Landeros has a past medical history of Depression, Dupuytren's disease, Hypertension, and UTI (urinary tract infection).  she has a past surgical history that includes knee surgery (Bilateral, );  section; Finger trigger release (Left, 2018); Carpal tunnel release; Finger trigger release (Right, 2023); and Finger trigger release (Right, 2024).  Kaleigh MARINELLI ,    Allergies Allergies   Allergen Reactions    Penicillins Hives      Medications   Current Outpatient Medications:     methotrexate Sodium 50 MG/2ML chemo injection, Inject 0.6 mLs into the skin once a week, Disp: 4 mL, Rfl: 1    Insulin Syringe-Needle U-100 31G X \" 1 ML MISC, 1 each by Does not apply route daily, Disp: 100 each, Rfl: 0    secukinumab, 300 MG Dose, (COSENTYX SENSOREADY, 300 MG,) 150 MG/ML SOAJ, INJECT 2 PENS UNDER THE SKIN EVERY 4 WEEKS, Disp: 2 Adjustable Dose Pre-filled Pen Syringe, Rfl: 4    folic acid (FOLVITE) 1 MG tablet, Take 1 tablet by mouth daily, Disp: 90 tablet, Rfl: 1    sertraline (ZOLOFT) 100 MG tablet, , Disp: , Rfl:     hydrOXYzine HCl (ATARAX) 10 MG tablet, Take 1 tablet by mouth 3 times daily as needed for Itching, Disp: , Rfl:     Multiple Vitamins-Minerals

## 2024-12-06 ENCOUNTER — HOSPITAL ENCOUNTER (OUTPATIENT)
Dept: PHYSICAL THERAPY | Age: 59
Setting detail: THERAPIES SERIES
Discharge: HOME | End: 2024-12-06
Payer: COMMERCIAL

## 2024-12-06 PROCEDURE — 97140 MANUAL THERAPY 1/> REGIONS: CPT

## 2024-12-06 PROCEDURE — 97530 THERAPEUTIC ACTIVITIES: CPT

## 2024-12-06 NOTE — PROGRESS NOTES
[facility]  PHYSICAL THERAPY  [] EVALUATION  [x] DAILY NOTE (LAND) [] DAILY NOTE (AQUATIC ) [] PROGRESS NOTE [] DISCHARGE NOTE    [] OUTPATIENT REHABILITATION CENTER [facility]   [] [place] [facility]    [] [facility]   [x] [facility]    Date: 2024  Patient Name:  Felicity Landeros  : 1965  MRN: [id number]  CSN: [id number]    Referring Practitioner Dary Valiente DO [id number]      Diagnosis  Diagnoses       M54.50 (ICD-10-CM) - Low back pain, unspecified    M25.552 (ICD-10-CM) - Pain in left hip           Treatment Diagnosis M54.42  Lumbago with Sciatica, Left Side  M54.59  Other Low Back Pain   Date of Evaluation 24   Additional Pertinent History Felicity Landeros has a past medical history of Depression, Dupuytren's disease, Hypertension, and UTI (urinary tract infection).  she has a past surgical history that includes knee surgery (Bilateral, );  section; Finger trigger release (Left, 2018); Carpal tunnel release; Finger trigger release (Right, 2023); and Finger trigger release (Right, 2024).  Kaleigh MARINELLI ,    Allergies Allergies   Allergen Reactions    Penicillins Hives      Medications   Current Outpatient Medications:     methotrexate Sodium 50 MG/2ML chemo injection, Inject 0.6 mLs into the skin once a week, Disp: 4 mL, Rfl: 1    Insulin Syringe-Needle U-100 31G X \" 1 ML MISC, 1 each by Does not apply route daily, Disp: 100 each, Rfl: 0    secukinumab, 300 MG Dose, (COSENTYX SENSOREADY, 300 MG,) 150 MG/ML SOAJ, INJECT 2 PENS UNDER THE SKIN EVERY 4 WEEKS, Disp: 2 Adjustable Dose Pre-filled Pen Syringe, Rfl: 4    folic acid (FOLVITE) 1 MG tablet, Take 1 tablet by mouth daily, Disp: 90 tablet, Rfl: 1    sertraline (ZOLOFT) 100 MG tablet, , Disp: , Rfl:     hydrOXYzine HCl (ATARAX) 10 MG tablet, Take 1 tablet by mouth 3 times daily as needed for Itching, Disp: , Rfl:     Multiple Vitamins-Minerals

## 2024-12-09 DIAGNOSIS — M46.90 AXIAL SPONDYLOARTHRITIS (HCC): ICD-10-CM

## 2024-12-09 RX ORDER — SECUKINUMAB 150 MG/ML
300 INJECTION SUBCUTANEOUS
Qty: 2 ADJUSTABLE DOSE PRE-FILLED PEN SYRINGE | Refills: 4 | Status: ACTIVE | OUTPATIENT
Start: 2024-12-09

## 2024-12-09 RX ORDER — SYRINGE-NEEDLE,INSULIN,0.5 ML 27GX1/2"
1 SYRINGE, EMPTY DISPOSABLE MISCELLANEOUS DAILY
Qty: 100 EACH | Refills: 0 | Status: SHIPPED | OUTPATIENT
Start: 2024-12-09

## 2024-12-10 ENCOUNTER — HOSPITAL ENCOUNTER (OUTPATIENT)
Dept: PHYSICAL THERAPY | Age: 59
Setting detail: THERAPIES SERIES
Discharge: HOME | End: 2024-12-10
Payer: COMMERCIAL

## 2024-12-10 PROCEDURE — 97530 THERAPEUTIC ACTIVITIES: CPT

## 2024-12-10 NOTE — PROGRESS NOTES
[facility]  PHYSICAL THERAPY  [] EVALUATION  [x] DAILY NOTE (LAND) [] DAILY NOTE (AQUATIC ) [] PROGRESS NOTE [] DISCHARGE NOTE    [] OUTPATIENT REHABILITATION CENTER [facility]   [] [place] [facility]    [] [facility]   [x] [facility]    Date: 12/10/2024  Patient Name:  Felicity Landeros  : 1965  MRN: [id number]  CSN: [id number]    Referring Practitioner Dary Valiente DO [id number]      Diagnosis  Diagnoses       M54.50 (ICD-10-CM) - Low back pain, unspecified    M25.552 (ICD-10-CM) - Pain in left hip           Treatment Diagnosis M54.42  Lumbago with Sciatica, Left Side  M54.59  Other Low Back Pain   Date of Evaluation 24   Additional Pertinent History Felicity Landeros has a past medical history of Depression, Dupuytren's disease, Hypertension, and UTI (urinary tract infection).  she has a past surgical history that includes knee surgery (Bilateral, );  section; Finger trigger release (Left, 2018); Carpal tunnel release; Finger trigger release (Right, 2023); and Finger trigger release (Right, 2024).  Kaleigh MARINELLI ,    Allergies Allergies   Allergen Reactions    Penicillins Hives      Medications   Current Outpatient Medications:     secukinumab, 300 MG Dose, (COSENTYX SENSOREADY, 300 MG,) 150 MG/ML SOAJ, Inject 2 mLs into the skin every 28 days, Disp: 2 Adjustable Dose Pre-filled Pen Syringe, Rfl: 4    Insulin Syringe-Needle U-100 31G X \" 1 ML MISC, 1 each by Does not apply route daily, Disp: 100 each, Rfl: 0    methotrexate Sodium 50 MG/2ML chemo injection, Inject 0.6 mLs into the skin once a week, Disp: 4 mL, Rfl: 1    folic acid (FOLVITE) 1 MG tablet, Take 1 tablet by mouth daily, Disp: 90 tablet, Rfl: 1    sertraline (ZOLOFT) 100 MG tablet, , Disp: , Rfl:     hydrOXYzine HCl (ATARAX) 10 MG tablet, Take 1 tablet by mouth 3 times daily as needed for Itching, Disp: , Rfl:     Multiple Vitamins-Minerals (THERAPEUTIC

## 2024-12-13 ENCOUNTER — HOSPITAL ENCOUNTER (OUTPATIENT)
Dept: PHYSICAL THERAPY | Age: 59
Setting detail: THERAPIES SERIES
Discharge: HOME | End: 2024-12-13
Payer: COMMERCIAL

## 2024-12-13 PROCEDURE — 97140 MANUAL THERAPY 1/> REGIONS: CPT

## 2024-12-13 PROCEDURE — 97530 THERAPEUTIC ACTIVITIES: CPT

## 2024-12-13 NOTE — PROGRESS NOTES
[facility]  PHYSICAL THERAPY  [] EVALUATION  [x] DAILY NOTE (LAND) [] DAILY NOTE (AQUATIC ) [] PROGRESS NOTE [] DISCHARGE NOTE    [] OUTPATIENT REHABILITATION CENTER [facility]   [] [place] [facility]    [] [facility]   [x] [facility]    Date: 2024  Patient Name:  Felicity Landeros  : 1965  MRN: [id number]  CSN: [id number]    Referring Practitioner Dary Valiente DO [id number]      Diagnosis  Diagnoses       M54.50 (ICD-10-CM) - Low back pain, unspecified    M25.552 (ICD-10-CM) - Pain in left hip           Treatment Diagnosis M54.42  Lumbago with Sciatica, Left Side  M54.59  Other Low Back Pain   Date of Evaluation 24   Additional Pertinent History Felicity Landeros has a past medical history of Depression, Dupuytren's disease, Hypertension, and UTI (urinary tract infection).  she has a past surgical history that includes knee surgery (Bilateral, );  section; Finger trigger release (Left, 2018); Carpal tunnel release; Finger trigger release (Right, 2023); and Finger trigger release (Right, 2024).  Kaleigh MARINELLI ,    Allergies Allergies   Allergen Reactions    Penicillins Hives      Medications   Current Outpatient Medications:     secukinumab, 300 MG Dose, (COSENTYX SENSOREADY, 300 MG,) 150 MG/ML SOAJ, Inject 2 mLs into the skin every 28 days, Disp: 2 Adjustable Dose Pre-filled Pen Syringe, Rfl: 4    Insulin Syringe-Needle U-100 31G X \" 1 ML MISC, 1 each by Does not apply route daily, Disp: 100 each, Rfl: 0    methotrexate Sodium 50 MG/2ML chemo injection, Inject 0.6 mLs into the skin once a week, Disp: 4 mL, Rfl: 1    folic acid (FOLVITE) 1 MG tablet, Take 1 tablet by mouth daily, Disp: 90 tablet, Rfl: 1    sertraline (ZOLOFT) 100 MG tablet, , Disp: , Rfl:     hydrOXYzine HCl (ATARAX) 10 MG tablet, Take 1 tablet by mouth 3 times daily as needed for Itching, Disp: , Rfl:     Multiple Vitamins-Minerals (THERAPEUTIC

## 2024-12-16 ENCOUNTER — HOSPITAL ENCOUNTER (OUTPATIENT)
Dept: PHYSICAL THERAPY | Age: 59
Setting detail: THERAPIES SERIES
Discharge: HOME | End: 2024-12-16
Payer: COMMERCIAL

## 2024-12-16 PROCEDURE — 97140 MANUAL THERAPY 1/> REGIONS: CPT

## 2024-12-16 PROCEDURE — 97110 THERAPEUTIC EXERCISES: CPT

## 2024-12-16 NOTE — PROGRESS NOTES
therapy, US or electrical stimuation.  Trial of Dry needlling today to see if she gets some relief in pain    Pain remains with sitting with pressure on the left buttocks.        Objective:  TREATMENT   Precautions:    Pain:7/10 L SI joint     \"X” in shaded column indicates activity completed today    “*\" next to exercise/intervention indicates progression   Modalities Parameters/  Location  Notes   US to the left si joint, L5S1 in right sidelying  10 min   1.2  cont      MHP  Concurrent with Supine Exercise      IFC  20 mins concurrent with prone exercise  Intensity increasing on regular intervals to 26.0V   Manual Therapy Time/Technique  Notes   Si joint mobs.  No change in pain   x    30 mm needling   L3-S1   more tightness on the left L5  X 6  x Rotation    Inferiro gluteal (piriformis )40 mm X2  x    Superior cluneal 3 40 mm  X 3  x    Exercise/Intervention   Notes   Nustep X5 min  x Load 5, arms 8, seat 4                 Supine:          Quad set x10 5 sec X       Glute sets x10 5 sec X       Abd set x10 5 sec X       Pelvic tilt x10   To neutral           Prone:          Press up x10   Reports decreased pain      Prop X2 min         Prone flat X2 min         Prone Hamstring Curls x10         Prone Hip Abduction x10         Prone Hip Extension  x10             Quadruped:    held Patient Reports this position is intolerable due to knee pain                                 SI joint  distraction, Gentle glides  no change in pain    x    Trunk flexion  wfl with pain in the left SI joint with return to standing     x    Palp-- left lateral sacrum and into piriformis region   x             Dry Needling Safety Questions Response   Are you currently receiving any form of cancer treatment? No   Do you have any form of a bleeding disorder? No   Have you ever fainted or experienced a seizure? No   Do you have a pacemaker or any other electrical implant?  No  Knees    Are you currently taking any anticoagulants? No   Are

## 2024-12-18 ENCOUNTER — HOSPITAL ENCOUNTER (OUTPATIENT)
Dept: PHYSICAL THERAPY | Age: 59
Setting detail: THERAPIES SERIES
Discharge: HOME | End: 2024-12-18
Payer: COMMERCIAL

## 2024-12-18 PROCEDURE — 97140 MANUAL THERAPY 1/> REGIONS: CPT

## 2024-12-18 NOTE — PROGRESS NOTES
[facility]  PHYSICAL THERAPY  [] EVALUATION  [x] DAILY NOTE (LAND) [] DAILY NOTE (AQUATIC ) [] PROGRESS NOTE [] DISCHARGE NOTE    [] OUTPATIENT REHABILITATION CENTER [facility]   [] [facility]    [] [facility]   [x] [facility]    Date: 2024  Patient Name:  Felicity Landeros  : 1965  MRN: [id number]  CSN: [id number]    Referring Practitioner Dary Valiente DO [id number]      Diagnosis  Diagnoses       M54.50 (ICD-10-CM) - Low back pain, unspecified    M25.552 (ICD-10-CM) - Pain in left hip           Treatment Diagnosis M54.42  Lumbago with Sciatica, Left Side  M54.59  Other Low Back Pain   Date of Evaluation 24   Additional Pertinent History Feilcity Landeros has a past medical history of Depression, Dupuytren's disease, Hypertension, and UTI (urinary tract infection).  she has a past surgical history that includes knee surgery (Bilateral, );  section; Finger trigger release (Left, 2018); Carpal tunnel release; Finger trigger release (Right, 2023); and Finger trigger release (Right, 2024).  Kaleigh MARINELLI ,    Allergies Allergies   Allergen Reactions    Penicillins Hives      Medications   Current Outpatient Medications:     secukinumab, 300 MG Dose, (COSENTYX SENSOREADY, 300 MG,) 150 MG/ML SOAJ, Inject 2 mLs into the skin every 28 days, Disp: 2 Adjustable Dose Pre-filled Pen Syringe, Rfl: 4    Insulin Syringe-Needle U-100 31G X \" 1 ML MISC, 1 each by Does not apply route daily, Disp: 100 each, Rfl: 0    methotrexate Sodium 50 MG/2ML chemo injection, Inject 0.6 mLs into the skin once a week, Disp: 4 mL, Rfl: 1    folic acid (FOLVITE) 1 MG tablet, Take 1 tablet by mouth daily, Disp: 90 tablet, Rfl: 1    sertraline (ZOLOFT) 100 MG tablet, , Disp: , Rfl:     hydrOXYzine HCl (ATARAX) 10 MG tablet, Take 1 tablet by mouth 3 times daily as needed for Itching, Disp: , Rfl:     Multiple Vitamins-Minerals (THERAPEUTIC

## 2024-12-23 ENCOUNTER — APPOINTMENT (OUTPATIENT)
Dept: PHYSICAL THERAPY | Age: 59
End: 2024-12-23
Payer: COMMERCIAL

## 2024-12-30 ENCOUNTER — HOSPITAL ENCOUNTER (OUTPATIENT)
Dept: PHYSICAL THERAPY | Age: 59
Setting detail: THERAPIES SERIES
Discharge: HOME | End: 2024-12-30
Payer: COMMERCIAL

## 2024-12-30 PROCEDURE — 97164 PT RE-EVAL EST PLAN CARE: CPT

## 2025-01-02 ENCOUNTER — PATIENT MESSAGE (OUTPATIENT)
Age: 60
End: 2025-01-02

## 2025-01-02 DIAGNOSIS — M54.50 CHRONIC MIDLINE LOW BACK PAIN WITHOUT SCIATICA: Primary | ICD-10-CM

## 2025-01-02 DIAGNOSIS — G89.29 CHRONIC MIDLINE LOW BACK PAIN WITHOUT SCIATICA: Primary | ICD-10-CM

## 2025-01-03 NOTE — TELEPHONE ENCOUNTER
Diagnosis Orders   1. Chronic midline low back pain without sciatica  MRI LUMBAR SPINE WO CONTRAST        - persistent lower back pain that is not improving w/ physical therpay. Physical therapy has been placed on hold.

## 2025-01-06 DIAGNOSIS — M46.90 AXIAL SPONDYLOARTHRITIS (HCC): ICD-10-CM

## 2025-01-06 RX ORDER — METHOTREXATE 25 MG/ML
15 INJECTION, SOLUTION INTRAMUSCULAR; INTRATHECAL; INTRAVENOUS; SUBCUTANEOUS WEEKLY
Qty: 4 ML | Refills: 0 | Status: SHIPPED | OUTPATIENT
Start: 2025-01-06

## 2025-01-06 NOTE — TELEPHONE ENCOUNTER
DOLV: 10/29/24  DONV: 1/23/25  LAST LAB DRAW: 11/14/24  LAST TB TEST: 4/2024    Lab Results   Component Value Date     11/14/2024    K 4.0 11/14/2024     11/14/2024    CO2 28 11/14/2024    BUN 17 11/14/2024    CREATININE 0.6 11/14/2024    GLUCOSE 104 11/14/2024    CALCIUM 9.8 11/14/2024    BILITOT 0.4 11/14/2024    ALKPHOS 91 11/14/2024    AST 21 11/14/2024    ALT 20 11/14/2024    LABGLOM > 90 11/14/2024       No results for input(s): \"WBC\", \"HGB\", \"HCT\", \"MCV\", \"PLT\" in the last 720 hours.    Lab Results   Component Value Date    SEDRATE 10 11/14/2024       Lab Results   Component Value Date    CRP < 0.30 11/14/2024

## 2025-01-20 ENCOUNTER — LAB (OUTPATIENT)
Dept: LAB | Age: 60
End: 2025-01-20

## 2025-01-20 LAB
25(OH)D3 SERPL-MCNC: 56 NG/ML (ref 30–100)
ALBUMIN SERPL BCG-MCNC: 4.2 G/DL (ref 3.5–5.1)
ALP SERPL-CCNC: 90 U/L (ref 38–126)
ALT SERPL W/O P-5'-P-CCNC: 15 U/L (ref 11–66)
ANION GAP SERPL CALC-SCNC: 14 MEQ/L (ref 8–16)
AST SERPL-CCNC: 17 U/L (ref 5–40)
BILIRUB SERPL-MCNC: 0.6 MG/DL (ref 0.3–1.2)
BUN SERPL-MCNC: 19 MG/DL (ref 7–22)
CALCIUM SERPL-MCNC: 9.3 MG/DL (ref 8.5–10.5)
CHLORIDE SERPL-SCNC: 104 MEQ/L (ref 98–111)
CHOLEST SERPL-MCNC: 168 MG/DL (ref 100–199)
CO2 SERPL-SCNC: 24 MEQ/L (ref 23–33)
CREAT SERPL-MCNC: 0.6 MG/DL (ref 0.4–1.2)
DEPRECATED RDW RBC AUTO: 42.8 FL (ref 35–45)
ERYTHROCYTE [DISTWIDTH] IN BLOOD BY AUTOMATED COUNT: 13.2 % (ref 11.5–14.5)
GFR SERPL CREATININE-BSD FRML MDRD: > 90 ML/MIN/1.73M2
GLUCOSE SERPL-MCNC: 101 MG/DL (ref 70–108)
HCT VFR BLD AUTO: 42.3 % (ref 37–47)
HDLC SERPL-MCNC: 56 MG/DL
HGB BLD-MCNC: 14.4 GM/DL (ref 12–16)
LDLC SERPL CALC-MCNC: 95 MG/DL
MCH RBC QN AUTO: 30.8 PG (ref 26–33)
MCHC RBC AUTO-ENTMCNC: 34 GM/DL (ref 32.2–35.5)
MCV RBC AUTO: 90.6 FL (ref 81–99)
PLATELET # BLD AUTO: 215 THOU/MM3 (ref 130–400)
PMV BLD AUTO: 10.1 FL (ref 9.4–12.4)
POTASSIUM SERPL-SCNC: 4.1 MEQ/L (ref 3.5–5.2)
PROT SERPL-MCNC: 7 G/DL (ref 6.1–8)
RBC # BLD AUTO: 4.67 MILL/MM3 (ref 4.2–5.4)
SODIUM SERPL-SCNC: 142 MEQ/L (ref 135–145)
TRIGL SERPL-MCNC: 86 MG/DL (ref 0–199)
TSH SERPL DL<=0.005 MIU/L-ACNC: 1.86 UIU/ML (ref 0.4–4.2)
VIT B12 SERPL-MCNC: 567 PG/ML (ref 211–911)
WBC # BLD AUTO: 4.8 THOU/MM3 (ref 4.8–10.8)

## 2025-01-22 ENCOUNTER — HOSPITAL ENCOUNTER (OUTPATIENT)
Dept: MRI IMAGING | Age: 60
Discharge: HOME | End: 2025-01-22
Attending: INTERNAL MEDICINE
Payer: COMMERCIAL

## 2025-01-22 ENCOUNTER — TELEPHONE (OUTPATIENT)
Age: 60
End: 2025-01-22

## 2025-01-22 DIAGNOSIS — M54.50 CHRONIC MIDLINE LOW BACK PAIN WITHOUT SCIATICA: ICD-10-CM

## 2025-01-22 DIAGNOSIS — M46.90 AXIAL SPONDYLOARTHRITIS (HCC): Primary | ICD-10-CM

## 2025-01-22 DIAGNOSIS — M48.061 SPINAL STENOSIS OF LUMBAR REGION, UNSPECIFIED WHETHER NEUROGENIC CLAUDICATION PRESENT: ICD-10-CM

## 2025-01-22 DIAGNOSIS — G89.29 CHRONIC MIDLINE LOW BACK PAIN WITHOUT SCIATICA: ICD-10-CM

## 2025-01-22 DIAGNOSIS — M48.061 LUMBAR FORAMINAL STENOSIS: ICD-10-CM

## 2025-01-22 PROCEDURE — 72148 MRI LUMBAR SPINE W/O DYE: CPT

## 2025-01-22 NOTE — TELEPHONE ENCOUNTER
Diagnosis Orders   1. Axial spondyloarthritis (HCC)        2. Chronic midline low back pain without sciatica  Preet Coleman DO, Pain Medicine, [place]      3. Spinal stenosis of lumbar region, unspecified whether neurogenic claudication present  Preet Coleman DO, Pain Medicine, [place]      4. Lumbar foraminal stenosis  Preet Coleman DO, Pain Medicine, [place]        - pt called and informed of the moderate to severe lumbar spinal stenosis at L4-L5 region along with mild spinal stenosis at other areas in the lumbar spine.  She was informed of the foraminal stenosis and other changes outlined in the MRI of the lumbar spine.  -We discussed potential red flag features to monitor for such as incontinence, saddle anesthesia, leg weakness.  If these develop she was asked to seek medical attention immediately.  -Declined referral to neurosurgery or orthospine  -Willing to proceed pain management treatment options prior to surgical intervention

## 2025-01-23 ENCOUNTER — OFFICE VISIT (OUTPATIENT)
Age: 60
End: 2025-01-23
Payer: COMMERCIAL

## 2025-01-23 VITALS
BODY MASS INDEX: 28.56 KG/M2 | OXYGEN SATURATION: 97 % | HEIGHT: 62 IN | DIASTOLIC BLOOD PRESSURE: 78 MMHG | WEIGHT: 155.2 LBS | HEART RATE: 84 BPM | SYSTOLIC BLOOD PRESSURE: 138 MMHG

## 2025-01-23 DIAGNOSIS — M48.061 LUMBAR FORAMINAL STENOSIS: ICD-10-CM

## 2025-01-23 DIAGNOSIS — G89.29 CHRONIC MIDLINE LOW BACK PAIN WITHOUT SCIATICA: ICD-10-CM

## 2025-01-23 DIAGNOSIS — M46.90 AXIAL SPONDYLOARTHRITIS (HCC): Primary | ICD-10-CM

## 2025-01-23 DIAGNOSIS — M48.061 SPINAL STENOSIS OF LUMBAR REGION, UNSPECIFIED WHETHER NEUROGENIC CLAUDICATION PRESENT: ICD-10-CM

## 2025-01-23 DIAGNOSIS — M54.50 CHRONIC MIDLINE LOW BACK PAIN WITHOUT SCIATICA: ICD-10-CM

## 2025-01-23 PROCEDURE — 99214 OFFICE O/P EST MOD 30 MIN: CPT | Performed by: INTERNAL MEDICINE

## 2025-01-23 PROCEDURE — 3075F SYST BP GE 130 - 139MM HG: CPT | Performed by: INTERNAL MEDICINE

## 2025-01-23 PROCEDURE — 3078F DIAST BP <80 MM HG: CPT | Performed by: INTERNAL MEDICINE

## 2025-01-23 RX ORDER — HYDROCODONE BITARTRATE AND ACETAMINOPHEN 5; 325 MG/1; MG/1
TABLET ORAL
COMMUNITY
Start: 2025-01-16

## 2025-01-23 ASSESSMENT — ENCOUNTER SYMPTOMS
GASTROINTESTINAL NEGATIVE: 1
EYES NEGATIVE: 1
RESPIRATORY NEGATIVE: 1

## 2025-01-23 NOTE — PROGRESS NOTES
Sitting, Cuff Size: Medium Adult)   Pulse 84   Ht 1.575 m (5' 2.01\")   Wt 70.4 kg (155 lb 3.2 oz)   SpO2 97%   BMI 28.38 kg/m²     Physical Exam  Vitals reviewed.   Constitutional:       Appearance: She is well-developed.   Cardiovascular:      Rate and Rhythm: Normal rate and regular rhythm.   Pulmonary:      Effort: Pulmonary effort is normal.      Breath sounds: Normal breath sounds.   Musculoskeletal:      Cervical back: Normal range of motion and neck supple.   Skin:     General: Skin is warm and dry.      Findings: No rash.   Neurological:      Mental Status: She is alert and oriented to person, place, and time.      Motor: Weakness present.      Deep Tendon Reflexes: Reflexes abnormal. Babinski sign absent on the right side. Babinski sign absent on the left side.      Reflex Scores:       Patellar reflexes are 4+ on the right side and 3+ on the left side.       Achilles reflexes are 2+ on the right side and 2+ on the left side.  Psychiatric:         Thought Content: Thought content normal.       Upper extremities:    Hands - dupeton contractures.     Lower extremities:  Weakness - knee flexion bilateral.   HIPS tender left outer hips - negative david / fadir   Knees/ankles - Non-tender   FEET : tender left mid foot w/ compression.     Spine: tender lower lumbar spine , + left SLR     RAPID 3:   1/23/2025 --- RAPID 3: 2.7 +9+7 = 18.7     Remission: <3  Low Disease Activity: <6  Moderate Disease Activity: >=6 and <=12  High Disease Activity: >12     LABS    CBC  Lab Results   Component Value Date/Time    WBC 4.8 01/20/2025 08:48 AM    RBC 4.67 01/20/2025 08:48 AM    RBC 4.13 07/06/2020 02:43 PM    HGB 14.4 01/20/2025 08:48 AM    HCT 42.3 01/20/2025 08:48 AM    MCV 90.6 01/20/2025 08:48 AM    MCH 30.8 01/20/2025 08:48 AM    MCHC 34.0 01/20/2025 08:48 AM    RDW 13.4 07/06/2020 02:43 PM     01/20/2025 08:48 AM    MPV 10.1 01/20/2025 08:48 AM       CMP  Lab Results   Component Value Date/Time    CALCIUM

## 2025-01-27 ENCOUNTER — HOSPITAL ENCOUNTER (OUTPATIENT)
Age: 60
Discharge: HOME | End: 2025-01-27
Payer: COMMERCIAL

## 2025-01-27 ENCOUNTER — HOSPITAL ENCOUNTER (OUTPATIENT)
Dept: GENERAL RADIOLOGY | Age: 60
Discharge: HOME | End: 2025-01-27
Payer: COMMERCIAL

## 2025-01-27 DIAGNOSIS — M46.90 AXIAL SPONDYLOARTHRITIS (HCC): ICD-10-CM

## 2025-01-27 PROCEDURE — 73630 X-RAY EXAM OF FOOT: CPT

## 2025-02-04 DIAGNOSIS — M46.90 AXIAL SPONDYLOARTHRITIS (HCC): ICD-10-CM

## 2025-02-04 RX ORDER — FOLIC ACID 1 MG/1
1 TABLET ORAL DAILY
Qty: 90 TABLET | Refills: 1 | Status: SHIPPED | OUTPATIENT
Start: 2025-02-04

## 2025-02-17 DIAGNOSIS — M46.90 AXIAL SPONDYLOARTHRITIS: ICD-10-CM

## 2025-02-17 RX ORDER — METHOTREXATE 25 MG/ML
15 INJECTION, SOLUTION INTRAMUSCULAR; INTRATHECAL; INTRAVENOUS; SUBCUTANEOUS WEEKLY
Qty: 4 ML | Refills: 0 | OUTPATIENT
Start: 2025-02-17

## 2025-02-18 ENCOUNTER — TRANSCRIBE ORDERS (OUTPATIENT)
Dept: ADMINISTRATIVE | Age: 60
End: 2025-02-18

## 2025-02-18 DIAGNOSIS — M81.0 OSTEOPOROSIS, UNSPECIFIED OSTEOPOROSIS TYPE, UNSPECIFIED PATHOLOGICAL FRACTURE PRESENCE: ICD-10-CM

## 2025-02-18 DIAGNOSIS — G89.29 CHRONIC LOW BACK PAIN, UNSPECIFIED BACK PAIN LATERALITY, UNSPECIFIED WHETHER SCIATICA PRESENT: ICD-10-CM

## 2025-02-18 DIAGNOSIS — M48.061 SPINAL STENOSIS OF LUMBAR REGION, UNSPECIFIED WHETHER NEUROGENIC CLAUDICATION PRESENT: Primary | ICD-10-CM

## 2025-02-18 DIAGNOSIS — M71.38 CYST OF LUMBAR FACET JOINT: ICD-10-CM

## 2025-02-18 DIAGNOSIS — M51.369 DEGENERATION OF INTERVERTEBRAL DISC OF LUMBAR REGION, UNSPECIFIED WHETHER PAIN PRESENT: ICD-10-CM

## 2025-02-18 DIAGNOSIS — M54.50 CHRONIC LOW BACK PAIN, UNSPECIFIED BACK PAIN LATERALITY, UNSPECIFIED WHETHER SCIATICA PRESENT: ICD-10-CM

## 2025-02-19 ENCOUNTER — LAB (OUTPATIENT)
Dept: LAB | Age: 60
End: 2025-02-19

## 2025-02-19 DIAGNOSIS — Z51.81 MEDICATION MONITORING ENCOUNTER: ICD-10-CM

## 2025-02-19 LAB
ALBUMIN SERPL BCG-MCNC: 4.5 G/DL (ref 3.5–5.1)
ALP SERPL-CCNC: 90 U/L (ref 38–126)
ALT SERPL W/O P-5'-P-CCNC: 28 U/L (ref 11–66)
ANION GAP SERPL CALC-SCNC: 13 MEQ/L (ref 8–16)
AST SERPL-CCNC: 29 U/L (ref 5–40)
BASOPHILS ABSOLUTE: 0 THOU/MM3 (ref 0–0.1)
BASOPHILS NFR BLD AUTO: 0.5 %
BILIRUB SERPL-MCNC: 0.5 MG/DL (ref 0.3–1.2)
BUN SERPL-MCNC: 15 MG/DL (ref 7–22)
CALCIUM SERPL-MCNC: 9.5 MG/DL (ref 8.2–9.6)
CHLORIDE SERPL-SCNC: 101 MEQ/L (ref 98–111)
CO2 SERPL-SCNC: 24 MEQ/L (ref 23–33)
CREAT SERPL-MCNC: 0.6 MG/DL (ref 0.4–1.2)
CRP SERPL-MCNC: < 0.3 MG/DL (ref 0–0.5)
DEPRECATED RDW RBC AUTO: 41.5 FL (ref 35–45)
EOSINOPHIL NFR BLD AUTO: 1.8 %
EOSINOPHILS ABSOLUTE: 0.1 THOU/MM3 (ref 0–0.4)
ERYTHROCYTE [DISTWIDTH] IN BLOOD BY AUTOMATED COUNT: 12.7 % (ref 11.5–14.5)
ERYTHROCYTE [SEDIMENTATION RATE] IN BLOOD BY WESTERGREN METHOD: 7 MM/HR (ref 0–20)
GFR SERPL CREATININE-BSD FRML MDRD: > 90 ML/MIN/1.73M2
GLUCOSE SERPL-MCNC: 122 MG/DL (ref 70–108)
HCT VFR BLD AUTO: 38.8 % (ref 37–47)
HGB BLD-MCNC: 13.1 GM/DL (ref 12–16)
IMM GRANULOCYTES # BLD AUTO: 0.02 THOU/MM3 (ref 0–0.07)
IMM GRANULOCYTES NFR BLD AUTO: 0.4 %
LYMPHOCYTES ABSOLUTE: 1.6 THOU/MM3 (ref 1–4.8)
LYMPHOCYTES NFR BLD AUTO: 27.2 %
MCH RBC QN AUTO: 30.5 PG (ref 26–33)
MCHC RBC AUTO-ENTMCNC: 33.8 GM/DL (ref 32.2–35.5)
MCV RBC AUTO: 90.2 FL (ref 81–99)
MONOCYTES ABSOLUTE: 0.5 THOU/MM3 (ref 0.4–1.3)
MONOCYTES NFR BLD AUTO: 7.9 %
NEUTROPHILS ABSOLUTE: 3.5 THOU/MM3 (ref 1.8–7.7)
NEUTROPHILS NFR BLD AUTO: 62.2 %
NRBC BLD AUTO-RTO: 0 /100 WBC
PLATELET # BLD AUTO: 213 THOU/MM3 (ref 130–400)
PMV BLD AUTO: 10.1 FL (ref 9.4–12.4)
POTASSIUM SERPL-SCNC: 3.8 MEQ/L (ref 3.5–5.2)
PROT SERPL-MCNC: 7 G/DL (ref 6.1–8)
RBC # BLD AUTO: 4.3 MILL/MM3 (ref 4.2–5.4)
SODIUM SERPL-SCNC: 138 MEQ/L (ref 135–145)
WBC # BLD AUTO: 5.7 THOU/MM3 (ref 4.8–10.8)

## 2025-02-20 DIAGNOSIS — M46.90 AXIAL SPONDYLOARTHRITIS: ICD-10-CM

## 2025-02-20 RX ORDER — METHOTREXATE 25 MG/ML
15 INJECTION, SOLUTION INTRAMUSCULAR; INTRATHECAL; INTRAVENOUS; SUBCUTANEOUS WEEKLY
Qty: 4 ML | Refills: 0 | Status: SHIPPED | OUTPATIENT
Start: 2025-02-20 | End: 2025-02-20 | Stop reason: SDUPTHER

## 2025-02-20 RX ORDER — METHOTREXATE 25 MG/ML
15 INJECTION, SOLUTION INTRAMUSCULAR; INTRATHECAL; INTRAVENOUS; SUBCUTANEOUS WEEKLY
Qty: 4 ML | Refills: 1 | Status: SHIPPED | OUTPATIENT
Start: 2025-02-20

## 2025-02-20 NOTE — TELEPHONE ENCOUNTER
DOLV: 01/23/25  DONV: 04/30/2025  LAST LAB DRAW: 02/19/2025  LAST TB TEST: 04/10/2024    Lab Results   Component Value Date     02/19/2025    K 3.8 02/19/2025     02/19/2025    CO2 24 02/19/2025    BUN 15 02/19/2025    CREATININE 0.6 02/19/2025    GLUCOSE 122 (H) 02/19/2025    CALCIUM 9.5 02/19/2025    BILITOT 0.5 02/19/2025    ALKPHOS 90 02/19/2025    AST 29 02/19/2025    ALT 28 02/19/2025    LABGLOM > 90 02/19/2025       Recent Labs     02/19/25  1521   WBC 5.7   HGB 13.1   HCT 38.8   MCV 90.2          Lab Results   Component Value Date    SEDRATE 7 02/19/2025       Lab Results   Component Value Date    CRP < 0.30 02/19/2025

## 2025-02-25 ENCOUNTER — PATIENT MESSAGE (OUTPATIENT)
Age: 60
End: 2025-02-25

## 2025-02-25 DIAGNOSIS — M46.90 AXIAL SPONDYLOARTHRITIS: ICD-10-CM

## 2025-02-27 RX ORDER — METHOTREXATE 25 MG/ML
15 INJECTION, SOLUTION INTRAMUSCULAR; INTRATHECAL; INTRAVENOUS; SUBCUTANEOUS WEEKLY
Qty: 4 ML | Refills: 1 | Status: SHIPPED | OUTPATIENT
Start: 2025-02-27

## 2025-03-14 ENCOUNTER — HOSPITAL ENCOUNTER (OUTPATIENT)
Dept: CT IMAGING | Age: 60
Discharge: HOME | End: 2025-03-14
Payer: COMMERCIAL

## 2025-03-14 ENCOUNTER — HOSPITAL ENCOUNTER (OUTPATIENT)
Dept: WOMENS IMAGING | Age: 60
Discharge: HOME | End: 2025-03-14
Payer: COMMERCIAL

## 2025-03-14 DIAGNOSIS — M81.0 OSTEOPOROSIS, UNSPECIFIED OSTEOPOROSIS TYPE, UNSPECIFIED PATHOLOGICAL FRACTURE PRESENCE: ICD-10-CM

## 2025-03-14 DIAGNOSIS — M71.38 CYST OF LUMBAR FACET JOINT: ICD-10-CM

## 2025-03-14 DIAGNOSIS — G89.29 CHRONIC LOW BACK PAIN, UNSPECIFIED BACK PAIN LATERALITY, UNSPECIFIED WHETHER SCIATICA PRESENT: ICD-10-CM

## 2025-03-14 DIAGNOSIS — M51.369 DEGENERATION OF INTERVERTEBRAL DISC OF LUMBAR REGION, UNSPECIFIED WHETHER PAIN PRESENT: ICD-10-CM

## 2025-03-14 DIAGNOSIS — M48.061 SPINAL STENOSIS OF LUMBAR REGION, UNSPECIFIED WHETHER NEUROGENIC CLAUDICATION PRESENT: ICD-10-CM

## 2025-03-14 DIAGNOSIS — M54.50 CHRONIC LOW BACK PAIN, UNSPECIFIED BACK PAIN LATERALITY, UNSPECIFIED WHETHER SCIATICA PRESENT: ICD-10-CM

## 2025-03-14 PROCEDURE — 77080 DXA BONE DENSITY AXIAL: CPT

## 2025-03-14 PROCEDURE — 72131 CT LUMBAR SPINE W/O DYE: CPT

## 2025-03-16 ENCOUNTER — RESULTS FOLLOW-UP (OUTPATIENT)
Dept: WOMENS IMAGING | Age: 60
End: 2025-03-16

## 2025-05-01 ENCOUNTER — OFFICE VISIT (OUTPATIENT)
Age: 60
End: 2025-05-01
Payer: COMMERCIAL

## 2025-05-01 VITALS
HEIGHT: 62 IN | HEART RATE: 85 BPM | WEIGHT: 152.2 LBS | OXYGEN SATURATION: 97 % | DIASTOLIC BLOOD PRESSURE: 76 MMHG | BODY MASS INDEX: 28.01 KG/M2 | SYSTOLIC BLOOD PRESSURE: 124 MMHG

## 2025-05-01 DIAGNOSIS — M48.061 SPINAL STENOSIS OF LUMBAR REGION, UNSPECIFIED WHETHER NEUROGENIC CLAUDICATION PRESENT: ICD-10-CM

## 2025-05-01 DIAGNOSIS — M46.90 AXIAL SPONDYLOARTHRITIS: ICD-10-CM

## 2025-05-01 DIAGNOSIS — G89.29 CHRONIC MIDLINE LOW BACK PAIN WITHOUT SCIATICA: ICD-10-CM

## 2025-05-01 DIAGNOSIS — M54.50 CHRONIC MIDLINE LOW BACK PAIN WITHOUT SCIATICA: ICD-10-CM

## 2025-05-01 DIAGNOSIS — Z51.81 MEDICATION MONITORING ENCOUNTER: Primary | ICD-10-CM

## 2025-05-01 PROCEDURE — 99214 OFFICE O/P EST MOD 30 MIN: CPT | Performed by: NURSE PRACTITIONER

## 2025-05-01 PROCEDURE — 3078F DIAST BP <80 MM HG: CPT | Performed by: NURSE PRACTITIONER

## 2025-05-01 PROCEDURE — 3074F SYST BP LT 130 MM HG: CPT | Performed by: NURSE PRACTITIONER

## 2025-05-01 RX ORDER — FOLIC ACID 1 MG/1
1 TABLET ORAL DAILY
Qty: 90 TABLET | Refills: 1 | Status: SHIPPED | OUTPATIENT
Start: 2025-05-01

## 2025-05-01 ASSESSMENT — ENCOUNTER SYMPTOMS
GASTROINTESTINAL NEGATIVE: 1
EYES NEGATIVE: 1
RESPIRATORY NEGATIVE: 1
BACK PAIN: 1

## 2025-05-01 NOTE — PROGRESS NOTES
[facility] RHEUMATOLOGY FOLLOW UP NOTE       Date Of Service: 2025  Provider: Aimee Holbrook APRN - CNP    Name: Felicity Landeros   MRN: [id number]    Chief Complaint(s)     Chief Complaint   Patient presents with    Follow-up     3 month follow up axial spondyloarthritis         History of Present Illness (HPI)     Felicity Landeros  is a(n)60 y.o. female with a hx of depression, HTN, UTI, anxiety, vitamin D def, HTN here for the f/u evaluation of seronegative inflammatory arthritis, carpal tunnel bilat, trigger finger, osteoarthritis bilateral hands, med monitoring.     Interval hx:    - had some lumbar injections with Dr. Lyman with no relief     pain affecting the low back   Pain on a scale 0-10: 8.5/10  Type of pain: constant  Timing: n/a  Aggravating factors: back: getting up from seated position   Alleviating factors: none     Associated symptoms:  denies swelling/  Redness/ warmth, denies AM stiffness       REVIEW OF SYSTEMS: (ROS)    Review of Systems   Constitutional: Negative.    HENT: Negative.     Eyes: Negative.    Respiratory: Negative.     Cardiovascular: Negative.    Gastrointestinal: Negative.    Endocrine: Negative.    Genitourinary: Negative.    Musculoskeletal:  Positive for back pain.   Skin: Negative.    Neurological: Negative.    Psychiatric/Behavioral: Negative.         PAST MEDICAL HISTORY      Past Medical History:   Diagnosis Date    Depression     Dupuytren's disease     Sciota's disease    Hypertension     UTI (urinary tract infection) 10/01/2020       PAST SURGICAL HISTORY      Past Surgical History:   Procedure Laterality Date    CARPAL TUNNEL RELEASE       SECTION      x3    FINGER TRIGGER RELEASE Left 2018    FINGER TRIGGER RELEASE Right 2023    ring finger    FINGER TRIGGER RELEASE Right 2024    KNEE SURGERY Bilateral 2016, 2016       FAMILY HISTORY      Family History   Problem Relation Age of Onset    High Blood Pressure

## 2025-05-08 ENCOUNTER — LAB (OUTPATIENT)
Dept: LAB | Age: 60
End: 2025-05-08

## 2025-05-08 DIAGNOSIS — Z51.81 MEDICATION MONITORING ENCOUNTER: ICD-10-CM

## 2025-05-08 DIAGNOSIS — M46.90 AXIAL SPONDYLOARTHRITIS: ICD-10-CM

## 2025-05-08 LAB
ALBUMIN SERPL BCG-MCNC: 4 G/DL (ref 3.4–4.9)
ALP SERPL-CCNC: 74 U/L (ref 38–126)
ALT SERPL W/O P-5'-P-CCNC: 19 U/L (ref 10–35)
ANION GAP SERPL CALC-SCNC: 9 MEQ/L (ref 8–16)
AST SERPL-CCNC: 26 U/L (ref 10–35)
BASOPHILS ABSOLUTE: 0 THOU/MM3 (ref 0–0.1)
BASOPHILS NFR BLD AUTO: 0.4 %
BILIRUB SERPL-MCNC: 0.5 MG/DL (ref 0.3–1.2)
BUN SERPL-MCNC: 17 MG/DL (ref 8–23)
CALCIUM SERPL-MCNC: 9.5 MG/DL (ref 8.8–10.2)
CHLORIDE SERPL-SCNC: 104 MEQ/L (ref 98–111)
CO2 SERPL-SCNC: 27 MEQ/L (ref 22–29)
CREAT SERPL-MCNC: 0.6 MG/DL (ref 0.5–0.9)
CRP SERPL-MCNC: < 0.3 MG/DL (ref 0–0.5)
DEPRECATED RDW RBC AUTO: 44.9 FL (ref 35–45)
EOSINOPHIL NFR BLD AUTO: 0.6 %
EOSINOPHILS ABSOLUTE: 0 THOU/MM3 (ref 0–0.4)
ERYTHROCYTE [DISTWIDTH] IN BLOOD BY AUTOMATED COUNT: 13.3 % (ref 11.5–14.5)
ERYTHROCYTE [SEDIMENTATION RATE] IN BLOOD BY WESTERGREN METHOD: 5 MM/HR (ref 0–20)
GFR SERPL CREATININE-BSD FRML MDRD: > 90 ML/MIN/1.73M2
GLUCOSE SERPL-MCNC: 93 MG/DL (ref 74–109)
HCT VFR BLD AUTO: 39.4 % (ref 37–47)
HGB BLD-MCNC: 13.1 GM/DL (ref 12–16)
IMM GRANULOCYTES # BLD AUTO: 0.01 THOU/MM3 (ref 0–0.07)
IMM GRANULOCYTES NFR BLD AUTO: 0.2 %
LYMPHOCYTES ABSOLUTE: 1.4 THOU/MM3 (ref 1–4.8)
LYMPHOCYTES NFR BLD AUTO: 28.6 %
MCH RBC QN AUTO: 30.5 PG (ref 26–33)
MCHC RBC AUTO-ENTMCNC: 33.2 GM/DL (ref 32.2–35.5)
MCV RBC AUTO: 91.6 FL (ref 81–99)
MONOCYTES ABSOLUTE: 0.4 THOU/MM3 (ref 0.4–1.3)
MONOCYTES NFR BLD AUTO: 8.4 %
NEUTROPHILS ABSOLUTE: 3 THOU/MM3 (ref 1.8–7.7)
NEUTROPHILS NFR BLD AUTO: 61.8 %
NRBC BLD AUTO-RTO: 0 /100 WBC
PLATELET # BLD AUTO: 217 THOU/MM3 (ref 130–400)
PMV BLD AUTO: 10.7 FL (ref 9.4–12.4)
POTASSIUM SERPL-SCNC: 3.9 MEQ/L (ref 3.5–5.2)
PROT SERPL-MCNC: 6.7 G/DL (ref 6.4–8.3)
RBC # BLD AUTO: 4.3 MILL/MM3 (ref 4.2–5.4)
SODIUM SERPL-SCNC: 140 MEQ/L (ref 135–145)
WBC # BLD AUTO: 4.8 THOU/MM3 (ref 4.8–10.8)

## 2025-05-09 ENCOUNTER — RESULTS FOLLOW-UP (OUTPATIENT)
Age: 60
End: 2025-05-09

## 2025-05-09 DIAGNOSIS — M46.90 AXIAL SPONDYLOARTHRITIS: ICD-10-CM

## 2025-05-09 RX ORDER — METHOTREXATE 25 MG/ML
15 INJECTION, SOLUTION INTRAMUSCULAR; INTRATHECAL; INTRAVENOUS; SUBCUTANEOUS WEEKLY
Qty: 4 ML | Refills: 1 | Status: SHIPPED | OUTPATIENT
Start: 2025-05-09 | End: 2025-07-07 | Stop reason: SDUPTHER

## 2025-06-02 DIAGNOSIS — M46.90 AXIAL SPONDYLOARTHRITIS: ICD-10-CM

## 2025-06-03 RX ORDER — SECUKINUMAB 150 MG/ML
INJECTION SUBCUTANEOUS
Qty: 2 ADJUSTABLE DOSE PRE-FILLED PEN SYRINGE | Refills: 4 | Status: ACTIVE | OUTPATIENT
Start: 2025-06-03

## 2025-06-13 DIAGNOSIS — M46.90 AXIAL SPONDYLOARTHRITIS: ICD-10-CM

## 2025-06-13 RX ORDER — METHOTREXATE 25 MG/ML
15 INJECTION, SOLUTION INTRAMUSCULAR; INTRATHECAL; INTRAVENOUS; SUBCUTANEOUS WEEKLY
Qty: 4 ML | Refills: 1 | OUTPATIENT
Start: 2025-06-13

## 2025-07-07 DIAGNOSIS — M46.90 AXIAL SPONDYLOARTHRITIS: ICD-10-CM

## 2025-07-08 RX ORDER — METHOTREXATE 25 MG/ML
15 INJECTION, SOLUTION INTRAMUSCULAR; INTRATHECAL; INTRAVENOUS; SUBCUTANEOUS WEEKLY
Qty: 4 ML | Refills: 0 | Status: SHIPPED | OUTPATIENT
Start: 2025-07-08

## 2025-07-08 NOTE — TELEPHONE ENCOUNTER
DOLV: 5/1/25  DONV: 9/8/25  LAST LAB DRAW: 5/8/25  LAST TB TEST: 4/10/24    Lab Results   Component Value Date     05/08/2025    K 3.9 05/08/2025     05/08/2025    CO2 27 05/08/2025    BUN 17 05/08/2025    CREATININE 0.6 05/08/2025    GLUCOSE 93 05/08/2025    CALCIUM 9.5 05/08/2025    BILITOT 0.5 05/08/2025    ALKPHOS 74 05/08/2025    AST 26 05/08/2025    ALT 19 05/08/2025    LABGLOM > 90 05/08/2025       No results for input(s): \"WBC\", \"HGB\", \"HCT\", \"MCV\", \"PLT\" in the last 720 hours.    Lab Results   Component Value Date    SEDRATE 5 05/08/2025       Lab Results   Component Value Date    CRP < 0.30 05/08/2025

## 2025-07-22 ENCOUNTER — LAB (OUTPATIENT)
Dept: LAB | Age: 60
End: 2025-07-22

## 2025-07-22 ENCOUNTER — HOSPITAL ENCOUNTER (OUTPATIENT)
Dept: MAMMOGRAPHY | Age: 60
Discharge: HOME | End: 2025-07-22
Payer: COMMERCIAL

## 2025-07-22 DIAGNOSIS — Z51.81 MEDICATION MONITORING ENCOUNTER: ICD-10-CM

## 2025-07-22 DIAGNOSIS — Z12.31 VISIT FOR SCREENING MAMMOGRAM: ICD-10-CM

## 2025-07-22 LAB
ALBUMIN SERPL BCG-MCNC: 4.2 G/DL (ref 3.4–4.9)
ALP SERPL-CCNC: 73 U/L (ref 38–126)
ALT SERPL W/O P-5'-P-CCNC: 23 U/L (ref 10–35)
ANION GAP SERPL CALC-SCNC: 11 MEQ/L (ref 8–16)
AST SERPL-CCNC: 28 U/L (ref 10–35)
BASOPHILS ABSOLUTE: 0 THOU/MM3 (ref 0–0.1)
BASOPHILS NFR BLD AUTO: 0.5 %
BILIRUB SERPL-MCNC: 0.8 MG/DL (ref 0.3–1.2)
BUN SERPL-MCNC: 19 MG/DL (ref 8–23)
CALCIUM SERPL-MCNC: 9.2 MG/DL (ref 8.8–10.2)
CHLORIDE SERPL-SCNC: 104 MEQ/L (ref 98–111)
CO2 SERPL-SCNC: 25 MEQ/L (ref 22–29)
CREAT SERPL-MCNC: 0.7 MG/DL (ref 0.5–0.9)
CRP SERPL-MCNC: < 0.3 MG/DL (ref 0–0.5)
DEPRECATED RDW RBC AUTO: 44.3 FL (ref 35–45)
EOSINOPHIL NFR BLD AUTO: 0.8 %
EOSINOPHILS ABSOLUTE: 0 THOU/MM3 (ref 0–0.4)
ERYTHROCYTE [DISTWIDTH] IN BLOOD BY AUTOMATED COUNT: 12.9 % (ref 11.5–14.5)
ERYTHROCYTE [SEDIMENTATION RATE] IN BLOOD BY WESTERGREN METHOD: 7 MM/HR (ref 0–20)
GFR SERPL CREATININE-BSD FRML MDRD: > 90 ML/MIN/1.73M2
GLUCOSE SERPL-MCNC: 67 MG/DL (ref 74–109)
HCT VFR BLD AUTO: 40.9 % (ref 37–47)
HGB BLD-MCNC: 13.7 GM/DL (ref 12–16)
IMM GRANULOCYTES # BLD AUTO: 0.03 THOU/MM3 (ref 0–0.07)
IMM GRANULOCYTES NFR BLD AUTO: 0.5 %
LYMPHOCYTES ABSOLUTE: 1.3 THOU/MM3 (ref 1–4.8)
LYMPHOCYTES NFR BLD AUTO: 22.2 %
MCH RBC QN AUTO: 31.4 PG (ref 26–33)
MCHC RBC AUTO-ENTMCNC: 33.5 GM/DL (ref 32.2–35.5)
MCV RBC AUTO: 93.8 FL (ref 81–99)
MONOCYTES ABSOLUTE: 0.5 THOU/MM3 (ref 0.4–1.3)
MONOCYTES NFR BLD AUTO: 8.4 %
NEUTROPHILS ABSOLUTE: 4.1 THOU/MM3 (ref 1.8–7.7)
NEUTROPHILS NFR BLD AUTO: 67.6 %
NRBC BLD AUTO-RTO: 0 /100 WBC
PLATELET # BLD AUTO: 216 THOU/MM3 (ref 130–400)
PMV BLD AUTO: 10 FL (ref 9.4–12.4)
POTASSIUM SERPL-SCNC: 3.8 MEQ/L (ref 3.5–5.2)
PROT SERPL-MCNC: 6.8 G/DL (ref 6.4–8.3)
RBC # BLD AUTO: 4.36 MILL/MM3 (ref 4.2–5.4)
SODIUM SERPL-SCNC: 140 MEQ/L (ref 135–145)
WBC # BLD AUTO: 6 THOU/MM3 (ref 4.8–10.8)

## 2025-07-22 PROCEDURE — 77063 BREAST TOMOSYNTHESIS BI: CPT

## 2025-08-29 DIAGNOSIS — M46.90 AXIAL SPONDYLOARTHRITIS: ICD-10-CM

## 2025-08-29 RX ORDER — METHOTREXATE 25 MG/ML
15 INJECTION, SOLUTION INTRAMUSCULAR; INTRATHECAL; INTRAVENOUS; SUBCUTANEOUS WEEKLY
Qty: 4 ML | Refills: 0 | Status: SHIPPED | OUTPATIENT
Start: 2025-08-29